# Patient Record
Sex: FEMALE | Race: WHITE | NOT HISPANIC OR LATINO | Employment: FULL TIME | URBAN - METROPOLITAN AREA
[De-identification: names, ages, dates, MRNs, and addresses within clinical notes are randomized per-mention and may not be internally consistent; named-entity substitution may affect disease eponyms.]

---

## 2019-01-27 ENCOUNTER — HOSPITAL ENCOUNTER (INPATIENT)
Facility: HOSPITAL | Age: 69
LOS: 3 days | Discharge: HOME | DRG: 202 | End: 2019-01-30
Attending: EMERGENCY MEDICINE | Admitting: EMERGENCY MEDICINE
Payer: MEDICARE

## 2019-01-27 DIAGNOSIS — J45.901 ASTHMA WITH ACUTE EXACERBATION, UNSPECIFIED ASTHMA SEVERITY, UNSPECIFIED WHETHER PERSISTENT: Primary | ICD-10-CM

## 2019-01-27 DIAGNOSIS — J45.901 EXACERBATION OF ASTHMA, UNSPECIFIED ASTHMA SEVERITY, UNSPECIFIED WHETHER PERSISTENT: ICD-10-CM

## 2019-01-27 DIAGNOSIS — R79.89 ELEVATED BRAIN NATRIURETIC PEPTIDE (BNP) LEVEL: ICD-10-CM

## 2019-01-27 DIAGNOSIS — R09.02 HYPOXEMIA: ICD-10-CM

## 2019-01-27 DIAGNOSIS — J98.01 BRONCHOSPASM: ICD-10-CM

## 2019-01-27 DIAGNOSIS — R29.818 SUSPECTED SLEEP APNEA: ICD-10-CM

## 2019-01-27 DIAGNOSIS — I10 ESSENTIAL HYPERTENSION: Chronic | ICD-10-CM

## 2019-01-27 LAB
ALBUMIN SERPL BCP-MCNC: 3.5 G/DL
ALLENS TEST: ABNORMAL
ALP SERPL-CCNC: 76 U/L
ALT SERPL W/O P-5'-P-CCNC: 15 U/L
ANION GAP SERPL CALC-SCNC: 9 MMOL/L
APTT BLDCRRT: 26.6 SEC
AST SERPL-CCNC: 20 U/L
BACTERIA #/AREA URNS AUTO: NORMAL /HPF
BASOPHILS # BLD AUTO: 0.05 K/UL
BASOPHILS NFR BLD: 0.8 %
BILIRUB SERPL-MCNC: 0.6 MG/DL
BILIRUB UR QL STRIP: NEGATIVE
BNP SERPL-MCNC: 230 PG/ML
BUN SERPL-MCNC: 21 MG/DL
CALCIUM SERPL-MCNC: 9.6 MG/DL
CHLORIDE SERPL-SCNC: 101 MMOL/L
CK MB SERPL-MCNC: 0.8 NG/ML
CK MB SERPL-RTO: 1.5 %
CK SERPL-CCNC: 52 U/L
CK SERPL-CCNC: 52 U/L
CLARITY UR REFRACT.AUTO: CLEAR
CO2 SERPL-SCNC: 30 MMOL/L
COLOR UR AUTO: YELLOW
CREAT SERPL-MCNC: 0.8 MG/DL
DELSYS: ABNORMAL
DIFFERENTIAL METHOD: ABNORMAL
EOSINOPHIL # BLD AUTO: 0.1 K/UL
EOSINOPHIL NFR BLD: 0.9 %
ERYTHROCYTE [DISTWIDTH] IN BLOOD BY AUTOMATED COUNT: 13.1 %
EST. GFR  (AFRICAN AMERICAN): >60 ML/MIN/1.73 M^2
EST. GFR  (NON AFRICAN AMERICAN): >60 ML/MIN/1.73 M^2
FIO2: 21
GLUCOSE SERPL-MCNC: 115 MG/DL
GLUCOSE UR QL STRIP: NEGATIVE
HCO3 UR-SCNC: 28.3 MMOL/L (ref 24–28)
HCT VFR BLD AUTO: 51 %
HGB BLD-MCNC: 16.6 G/DL
HGB UR QL STRIP: ABNORMAL
HYALINE CASTS UR QL AUTO: 1 /LPF
INFLUENZA A, MOLECULAR: NEGATIVE
INFLUENZA B, MOLECULAR: NEGATIVE
INR PPP: 1.1
KETONES UR QL STRIP: NEGATIVE
LACTATE SERPL-SCNC: 1.6 MMOL/L
LEUKOCYTE ESTERASE UR QL STRIP: NEGATIVE
LYMPHOCYTES # BLD AUTO: 1.2 K/UL
LYMPHOCYTES NFR BLD: 19 %
MCH RBC QN AUTO: 32.8 PG
MCHC RBC AUTO-ENTMCNC: 32.5 G/DL
MCV RBC AUTO: 101 FL
MICROSCOPIC COMMENT: NORMAL
MODE: ABNORMAL
MONOCYTES # BLD AUTO: 0.8 K/UL
MONOCYTES NFR BLD: 12.5 %
NEUTROPHILS # BLD AUTO: 4.3 K/UL
NEUTROPHILS NFR BLD: 66.6 %
NITRITE UR QL STRIP: NEGATIVE
PCO2 BLDA: 51.3 MMHG (ref 35–45)
PH SMN: 7.35 [PH] (ref 7.35–7.45)
PH UR STRIP: 6 [PH] (ref 5–8)
PLATELET # BLD AUTO: 143 K/UL
PMV BLD AUTO: 10.5 FL
PO2 BLDA: 43 MMHG (ref 80–100)
POC BE: 3 MMOL/L
POC SATURATED O2: 75 % (ref 95–100)
POTASSIUM SERPL-SCNC: 3.7 MMOL/L
PROT SERPL-MCNC: 7.6 G/DL
PROT UR QL STRIP: ABNORMAL
PROTHROMBIN TIME: 11 SEC
RBC # BLD AUTO: 5.06 M/UL
RBC #/AREA URNS AUTO: 3 /HPF (ref 0–4)
SAMPLE: ABNORMAL
SITE: ABNORMAL
SODIUM SERPL-SCNC: 140 MMOL/L
SP GR UR STRIP: 1.02 (ref 1–1.03)
SPECIMEN SOURCE: NORMAL
TROPONIN I SERPL DL<=0.01 NG/ML-MCNC: <0.006 NG/ML
URN SPEC COLLECT METH UR: ABNORMAL
UROBILINOGEN UR STRIP-ACNC: NEGATIVE EU/DL
WBC # BLD AUTO: 6.48 K/UL
WBC #/AREA URNS AUTO: 1 /HPF (ref 0–5)

## 2019-01-27 PROCEDURE — 99285 EMERGENCY DEPT VISIT HI MDM: CPT | Mod: 25,ER

## 2019-01-27 PROCEDURE — 96366 THER/PROPH/DIAG IV INF ADDON: CPT | Mod: ER

## 2019-01-27 PROCEDURE — 85610 PROTHROMBIN TIME: CPT | Mod: ER

## 2019-01-27 PROCEDURE — 87502 INFLUENZA DNA AMP PROBE: CPT | Mod: ER

## 2019-01-27 PROCEDURE — 36600 WITHDRAWAL OF ARTERIAL BLOOD: CPT | Mod: ER

## 2019-01-27 PROCEDURE — 94761 N-INVAS EAR/PLS OXIMETRY MLT: CPT

## 2019-01-27 PROCEDURE — 25000242 PHARM REV CODE 250 ALT 637 W/ HCPCS: Mod: ER | Performed by: EMERGENCY MEDICINE

## 2019-01-27 PROCEDURE — 94640 AIRWAY INHALATION TREATMENT: CPT | Mod: ER

## 2019-01-27 PROCEDURE — 93010 ELECTROCARDIOGRAM REPORT: CPT | Mod: ,,, | Performed by: NUCLEAR MEDICINE

## 2019-01-27 PROCEDURE — 84484 ASSAY OF TROPONIN QUANT: CPT | Mod: ER

## 2019-01-27 PROCEDURE — 96375 TX/PRO/DX INJ NEW DRUG ADDON: CPT | Mod: ER

## 2019-01-27 PROCEDURE — 63600175 PHARM REV CODE 636 W HCPCS: Mod: ER | Performed by: EMERGENCY MEDICINE

## 2019-01-27 PROCEDURE — 25500020 PHARM REV CODE 255: Mod: ER | Performed by: EMERGENCY MEDICINE

## 2019-01-27 PROCEDURE — 83880 ASSAY OF NATRIURETIC PEPTIDE: CPT | Mod: ER

## 2019-01-27 PROCEDURE — 25000242 PHARM REV CODE 250 ALT 637 W/ HCPCS: Performed by: NURSE PRACTITIONER

## 2019-01-27 PROCEDURE — 85025 COMPLETE CBC W/AUTO DIFF WBC: CPT | Mod: ER

## 2019-01-27 PROCEDURE — 82553 CREATINE MB FRACTION: CPT | Mod: ER

## 2019-01-27 PROCEDURE — 94640 AIRWAY INHALATION TREATMENT: CPT

## 2019-01-27 PROCEDURE — 93005 ELECTROCARDIOGRAM TRACING: CPT | Mod: ER

## 2019-01-27 PROCEDURE — 99900035 HC TECH TIME PER 15 MIN (STAT): Mod: ER

## 2019-01-27 PROCEDURE — 81000 URINALYSIS NONAUTO W/SCOPE: CPT | Mod: ER

## 2019-01-27 PROCEDURE — 21400001 HC TELEMETRY ROOM

## 2019-01-27 PROCEDURE — 87040 BLOOD CULTURE FOR BACTERIA: CPT

## 2019-01-27 PROCEDURE — 96365 THER/PROPH/DIAG IV INF INIT: CPT | Mod: ER

## 2019-01-27 PROCEDURE — 82550 ASSAY OF CK (CPK): CPT | Mod: ER

## 2019-01-27 PROCEDURE — 85730 THROMBOPLASTIN TIME PARTIAL: CPT | Mod: ER

## 2019-01-27 PROCEDURE — 80053 COMPREHEN METABOLIC PANEL: CPT | Mod: ER

## 2019-01-27 PROCEDURE — 93010 EKG 12-LEAD: ICD-10-PCS | Mod: ,,, | Performed by: NUCLEAR MEDICINE

## 2019-01-27 PROCEDURE — 96367 TX/PROPH/DG ADDL SEQ IV INF: CPT | Mod: ER

## 2019-01-27 PROCEDURE — 83605 ASSAY OF LACTIC ACID: CPT | Mod: ER

## 2019-01-27 RX ORDER — LEVOFLOXACIN 5 MG/ML
750 INJECTION, SOLUTION INTRAVENOUS
Status: COMPLETED | OUTPATIENT
Start: 2019-01-27 | End: 2019-01-27

## 2019-01-27 RX ORDER — SODIUM CHLORIDE 0.9 % (FLUSH) 0.9 %
5 SYRINGE (ML) INJECTION
Status: DISCONTINUED | OUTPATIENT
Start: 2019-01-27 | End: 2019-01-30 | Stop reason: HOSPADM

## 2019-01-27 RX ORDER — ASCORBIC ACID 1000 MG
TABLET ORAL
COMMUNITY

## 2019-01-27 RX ORDER — MULTIVITAMIN
1 TABLET ORAL
COMMUNITY

## 2019-01-27 RX ORDER — IPRATROPIUM BROMIDE AND ALBUTEROL SULFATE 2.5; .5 MG/3ML; MG/3ML
3 SOLUTION RESPIRATORY (INHALATION)
Status: COMPLETED | OUTPATIENT
Start: 2019-01-27 | End: 2019-01-27

## 2019-01-27 RX ORDER — FLUTICASONE PROPIONATE AND SALMETEROL 100; 50 UG/1; UG/1
1 POWDER RESPIRATORY (INHALATION)
COMMUNITY
Start: 2018-07-17 | End: 2019-03-22

## 2019-01-27 RX ORDER — ATENOLOL AND CHLORTHALIDONE TABLET 50; 25 MG/1; MG/1
1 TABLET ORAL
COMMUNITY
Start: 2018-08-31

## 2019-01-27 RX ORDER — PNV NO.95/FERROUS FUM/FOLIC AC 28MG-0.8MG
1000 TABLET ORAL
COMMUNITY

## 2019-01-27 RX ORDER — ENOXAPARIN SODIUM 100 MG/ML
40 INJECTION SUBCUTANEOUS EVERY 24 HOURS
Status: DISCONTINUED | OUTPATIENT
Start: 2019-01-27 | End: 2019-01-30 | Stop reason: HOSPADM

## 2019-01-27 RX ORDER — IBUPROFEN 100 MG/5ML
1000 SUSPENSION, ORAL (FINAL DOSE FORM) ORAL
COMMUNITY

## 2019-01-27 RX ORDER — IPRATROPIUM BROMIDE AND ALBUTEROL SULFATE 2.5; .5 MG/3ML; MG/3ML
3 SOLUTION RESPIRATORY (INHALATION)
Status: DISCONTINUED | OUTPATIENT
Start: 2019-01-28 | End: 2019-01-30 | Stop reason: HOSPADM

## 2019-01-27 RX ORDER — CYANOCOBALAMIN (VITAMIN B-12) 250 MCG
250 TABLET ORAL
COMMUNITY

## 2019-01-27 RX ORDER — METHYLPREDNISOLONE SOD SUCC 125 MG
125 VIAL (EA) INJECTION
Status: COMPLETED | OUTPATIENT
Start: 2019-01-27 | End: 2019-01-27

## 2019-01-27 RX ORDER — IPRATROPIUM BROMIDE AND ALBUTEROL SULFATE 2.5; .5 MG/3ML; MG/3ML
3 SOLUTION RESPIRATORY (INHALATION) EVERY 4 HOURS PRN
Status: DISCONTINUED | OUTPATIENT
Start: 2019-01-27 | End: 2019-01-30 | Stop reason: HOSPADM

## 2019-01-27 RX ADMIN — LEVOFLOXACIN 750 MG: 750 INJECTION, SOLUTION INTRAVENOUS at 11:01

## 2019-01-27 RX ADMIN — IPRATROPIUM BROMIDE AND ALBUTEROL SULFATE 3 ML: .5; 3 SOLUTION RESPIRATORY (INHALATION) at 02:01

## 2019-01-27 RX ADMIN — IPRATROPIUM BROMIDE AND ALBUTEROL SULFATE 3 ML: .5; 3 SOLUTION RESPIRATORY (INHALATION) at 11:01

## 2019-01-27 RX ADMIN — CEFTRIAXONE 1 G: 1 INJECTION, SOLUTION INTRAVENOUS at 11:01

## 2019-01-27 RX ADMIN — METHYLPREDNISOLONE SODIUM SUCCINATE 125 MG: 125 INJECTION, POWDER, FOR SOLUTION INTRAMUSCULAR; INTRAVENOUS at 11:01

## 2019-01-27 RX ADMIN — IOHEXOL 75 ML: 350 INJECTION, SOLUTION INTRAVENOUS at 02:01

## 2019-01-27 NOTE — ED NOTES
"Informed Dr. Matthews that pt states "I change my mind and now wants to go to [facility] facility."   "

## 2019-01-27 NOTE — ED PROVIDER NOTES
Encounter Date: 1/27/2019       History     Chief Complaint   Patient presents with    Nasal Congestion     associated with SOB and a dry cough. Fever 100.6 last night, took advil.      Lifelong history of asthma, reports she is only maintained on Advair and does not have other inhalers.  Not on oxygen or nebulizers at home. No other MDI's.  Apparently no hospitalization just for asthma, although she has been hospitalized for pneumonia in the past.  No other history of lung disease, venous thromboembolic disease, or heart disease.  No recent antibiotics or steroids.  Primary care doctor lorena nye, is not followed by pulmonology or other specialists besides an eye doctor.  Mild malaise onset yesterday, worse today with herberth dyspnea, minimal sputum production, dry cough, and fever as high as 100.6.  Headache, generalized ache and dyspnea progressing during the day today.  Has not smoked in over 14 years.  No other complaints.  Here with family.      The history is provided by the patient and the spouse. No  was used.     Review of patient's allergies indicates:  No Known Allergies  History reviewed. No pertinent past medical history.  No past surgical history on file.  History reviewed. No pertinent family history.  Social History     Tobacco Use    Smoking status: Not on file   Substance Use Topics    Alcohol use: Not on file    Drug use: Not on file     Review of Systems   Constitutional: Negative for activity change, fatigue and fever.   HENT: Negative for congestion, ear pain, facial swelling, nosebleeds, sinus pressure and sore throat.    Eyes: Negative for pain, discharge, redness and visual disturbance.   Respiratory: Positive for cough, shortness of breath and wheezing. Negative for choking and chest tightness.    Cardiovascular: Negative for chest pain, palpitations and leg swelling.   Gastrointestinal: Negative for abdominal distention, abdominal pain, nausea and vomiting.   Endocrine:  Negative for heat intolerance, polydipsia and polyuria.   Genitourinary: Negative for difficulty urinating, dysuria, flank pain, hematuria and urgency.   Musculoskeletal: Negative for back pain, gait problem, joint swelling and myalgias.   Skin: Negative for color change and rash.   Allergic/Immunologic: Negative for environmental allergies and food allergies.   Neurological: Negative for dizziness, weakness, numbness and headaches.   Hematological: Negative for adenopathy. Does not bruise/bleed easily.   Psychiatric/Behavioral: Negative for agitation and behavioral problems. The patient is not nervous/anxious.    All other systems reviewed and are negative.      Physical Exam     Initial Vitals [01/27/19 1030]   BP Pulse Resp Temp SpO2   (!) 182/81 79 20 98.4 °F (36.9 °C) (!) 77 %      MAP       --         Physical Exam    Nursing note and vitals reviewed.  Constitutional: She appears well-developed and well-nourished. She is not diaphoretic. No distress.   Obese   HENT:   Head: Normocephalic and atraumatic.   Mouth/Throat: No oropharyngeal exudate.   Eyes: Conjunctivae and EOM are normal. Pupils are equal, round, and reactive to light. Right eye exhibits no discharge. Left eye exhibits no discharge. No scleral icterus.   Neck: Normal range of motion. Neck supple. No thyromegaly present. No tracheal deviation present. No JVD present.   Cardiovascular: Normal rate, regular rhythm, normal heart sounds and intact distal pulses. Exam reveals no gallop and no friction rub.    No murmur heard.  Pulmonary/Chest: No stridor. She is in respiratory distress. She has wheezes. She has no rhonchi. She has no rales. She exhibits no tenderness.   Decreased air entry throughout/ moderate diffuse wheezing   Abdominal: Soft. Bowel sounds are normal. She exhibits no distension and no mass. There is no tenderness. There is no rebound and no guarding.   Musculoskeletal: Normal range of motion. She exhibits no edema or tenderness.    Neurological: She is alert and oriented to person, place, and time. She has normal strength.   Skin: Skin is warm and dry. No rash and no abscess noted. No erythema.   Mild cyanosis   Psychiatric: She has a normal mood and affect. Her behavior is normal. Judgment and thought content normal.         ED Course   Procedures  Labs Reviewed   CBC W/ AUTO DIFFERENTIAL - Abnormal; Notable for the following components:       Result Value    Hemoglobin 16.6 (*)     Hematocrit 51.0 (*)      (*)     MCH 32.8 (*)     Platelets 143 (*)     All other components within normal limits   COMPREHENSIVE METABOLIC PANEL - Abnormal; Notable for the following components:    CO2 30 (*)     Glucose 115 (*)     All other components within normal limits   URINALYSIS, REFLEX TO URINE CULTURE - Abnormal; Notable for the following components:    Protein, UA 2+ (*)     Occult Blood UA Trace (*)     All other components within normal limits    Narrative:     Preferred Collection Type->Urine, Clean Catch   B-TYPE NATRIURETIC PEPTIDE - Abnormal; Notable for the following components:     (*)     All other components within normal limits   ISTAT PROCEDURE - Abnormal; Notable for the following components:    POC PCO2 51.3 (*)     POC PO2 43 (*)     POC HCO3 28.3 (*)     POC SATURATED O2 75 (*)     All other components within normal limits   INFLUENZA A & B BY MOLECULAR   CULTURE, BLOOD   CULTURE, BLOOD   LACTIC ACID, PLASMA   TROPONIN I   PROTIME-INR   APTT   CK   CK-MB   URINALYSIS MICROSCOPIC    Narrative:     Preferred Collection Type->Urine, Clean Catch     EKG Readings: (Independently Interpreted)   Initial Reading: No STEMI. Rhythm: Normal Sinus Rhythm. Heart Rate: 67. Ectopy: No Ectopy. Conduction: Normal. Axis: Normal.   Noisy baseline/ nonspecific ST& T changes       Imaging Results          CTA Chest Non-Coronary (PE Study) (Final result)  Result time 01/27/19 14:20:58    Final result by Stefan Phillips Jr., MD (01/27/19  14:20:58)                 Impression:      No pulmonary emboli are seen.  No acute pulmonary parenchymal findings.  Scattered small lymph nodes, as above.    All CT scans at this facility are performed  using dose modulation techniques as appropriate to performed exam including the following:  automated exposure control; adjustment of mA and/or kV according to the patients size (this includes techniques or standardized protocols for targeted exams where dose is matched to indication/reason for exam: i.e. extremities or head);  iterative reconstruction technique.      Electronically signed by: Chris Reaves MD  Date:    01/27/2019  Time:    14:20             Narrative:    EXAMINATION:  CTA CHEST NON CORONARY    CLINICAL HISTORY:  Dyspnea, cardiac origin suspected;    TECHNIQUE:  Postcontrast axial images of the chest were obtained.   Omnipaque 350 was administered.  Multiplanar thick slab MIP re-formatted images were produced and reviewed.    COMPARISON:  No comparison studies available.    FINDINGS:  Angiogram: Good opacification of the pulmonary arterial system.  No central pulmonary emboli.  No peripheral pulmonary emboli.    Pulmonary: No infiltrates or effusion.  No suspicious pulmonary mass.  Aortic and coronary artery calcifications.  No pericardial fluid.  Scattered mediastinal lymph nodes are present, largest near the AP window region, measuring 2.0 cm.  Small bilateral hilar lymph nodes, largest on the right, measuring 1.5 cm.  No acute upper abdominal findings.  Small retrocrural lymph nodes.                               X-Ray Chest PA And Lateral (Final result)  Result time 01/27/19 12:29:31    Final result by Dm Andre MD (01/27/19 12:29:31)                 Impression:      1.  There is similar degree and distribution of interstitial changes and vascular congestion throughout the lungs.  This could be related to chronic underlying interstitial lung disease versus recurrent interstitial changes  throughout the lungs, which could include vascular congestion/pulmonary edema or interstitial infectious process.  Clinical correlation is advised.    2.  Other stable findings as noted above.      Electronically signed by: Dm Andre MD  Date:    01/27/2019  Time:    12:29             Narrative:    EXAMINATION:  XR CHEST PA AND LATERAL    CLINICAL HISTORY:  Cough;    COMPARISON:  March 22, 2016    FINDINGS:  EKG leads overlie the chest which is rotated to the right.  There is a similar degree in distribution of interstitial changes and vascular congestion throughout the lungs.  The lungs are free of new pulmonary opacities.  The cardiac silhouette size is enlarged.  The trachea is midline and the mediastinal width is normal. Negative for effusion or pneumothorax.  Negative for osseous abnormalities. Ectatic and tortuous aorta.  Calcifications in the aortic knob.  Marginal spondylosis.  Degenerative changes of the shoulder girdles.                                  11:21 AM O2 sat 93-94% on 5 liters NC O2.    12:42 PM Stable, maintaining oxygen saturation of 92% on 5 L nasal cannula.  Improved air entry and bronchospasm on exam.  Reviewed results with patient and .  Unable to obtain D-dimer at this facility at present, will proceed with CTA chest to rule out pulmonary embolism.  Anticipate Hospital Medicine admission  for hypoxemia and persistent asthma exacerbation.      2:44 PM Stable. Oxygen saturation 91 or 92% on 4-5 L nasal cannula.  Lung exam improved.  Mental status remains normal.  Counseled patient and  in detail, CT a does not reveal evidence for pulmonary embolism or infiltrate.  She will need more aggressive asthma treatment after her immediate inpatient stay.  I have counseled the patient and her  regarding the need for transfer to a higher level of care for inpatient admission, and they have verbalized understanding.  The reason for transfer is services not available at this  freestanding ER, particularly admission for Hospital Medicine consultation and possible pulmonary medicine consultation.  She will be transported by [facility] with continued supplemental oxygen and routine cardiopulmonary monitoring in route, inhaled bronchodilators in route if needed.  Risk of transfer includes motor vehicle accident, worsening of condition, and death.  Benefit of transfer includes available services as described above.  The patient's facility of choice is the [facility], we have requested their services.  Stable for ground transport.    3:09 PM She has changed her mind and is now requesting [facility] [place].  All other considerations above are the same.                              Clinical Impression:     1. Hypoxemia    2. Bronchospasm    3. Exacerbation of asthma, unspecified asthma severity, unspecified whether persistent            Disposition:   Disposition: Admitted  Condition: [facility] [place] [facility] Telemetry Inpatient Dr. Kemal Matthews MD  01/27/19 1447       Antony Matthews MD  01/27/19 1510       Antony Matthews MD  01/27/19 1516

## 2019-01-27 NOTE — ED NOTES
[facility] aware of pt's change of preference for transport to [facility].

## 2019-01-27 NOTE — ED NOTES
Annabelle, RT at bedside. Unable to obtain SPO2 above 77% in triage. Hands are cold. Dr. Matthews aware.

## 2019-01-28 PROBLEM — J45.901 ASTHMA WITH ACUTE EXACERBATION: Status: ACTIVE | Noted: 2019-01-28

## 2019-01-28 PROBLEM — J45.901 ASTHMA WITH ACUTE EXACERBATION: Status: RESOLVED | Noted: 2019-01-28 | Resolved: 2019-01-28

## 2019-01-28 PROBLEM — J96.01 ACUTE RESPIRATORY FAILURE WITH HYPOXIA AND HYPERCAPNIA: Status: ACTIVE | Noted: 2019-01-27

## 2019-01-28 PROBLEM — J96.02 ACUTE RESPIRATORY FAILURE WITH HYPOXIA AND HYPERCAPNIA: Status: ACTIVE | Noted: 2019-01-27

## 2019-01-28 PROBLEM — J42 CHRONIC BRONCHITIS WITH ACUTE EXACERBATION: Status: ACTIVE | Noted: 2019-01-28

## 2019-01-28 PROBLEM — J20.9 CHRONIC BRONCHITIS WITH ACUTE EXACERBATION: Status: ACTIVE | Noted: 2019-01-28

## 2019-01-28 LAB
ALLENS TEST: ABNORMAL
DELSYS: ABNORMAL
FLOW: 5
HCO3 UR-SCNC: 31.7 MMOL/L (ref 24–28)
MODE: ABNORMAL
PCO2 BLDA: 60.3 MMHG (ref 35–45)
PH SMN: 7.33 [PH] (ref 7.35–7.45)
PO2 BLDA: 60 MMHG (ref 80–100)
POC BE: 6 MMOL/L
POC SATURATED O2: 88 % (ref 95–100)
SAMPLE: ABNORMAL
SITE: ABNORMAL

## 2019-01-28 PROCEDURE — 94640 AIRWAY INHALATION TREATMENT: CPT

## 2019-01-28 PROCEDURE — 63600175 PHARM REV CODE 636 W HCPCS: Performed by: EMERGENCY MEDICINE

## 2019-01-28 PROCEDURE — 99900035 HC TECH TIME PER 15 MIN (STAT)

## 2019-01-28 PROCEDURE — 25000242 PHARM REV CODE 250 ALT 637 W/ HCPCS: Performed by: NURSE PRACTITIONER

## 2019-01-28 PROCEDURE — 82803 BLOOD GASES ANY COMBINATION: CPT

## 2019-01-28 PROCEDURE — 63600175 PHARM REV CODE 636 W HCPCS: Performed by: NURSE PRACTITIONER

## 2019-01-28 PROCEDURE — 94799 UNLISTED PULMONARY SVC/PX: CPT

## 2019-01-28 PROCEDURE — 21400001 HC TELEMETRY ROOM

## 2019-01-28 PROCEDURE — 25000003 PHARM REV CODE 250: Performed by: NURSE PRACTITIONER

## 2019-01-28 PROCEDURE — 25000242 PHARM REV CODE 250 ALT 637 W/ HCPCS: Performed by: INTERNAL MEDICINE

## 2019-01-28 PROCEDURE — 94761 N-INVAS EAR/PLS OXIMETRY MLT: CPT

## 2019-01-28 PROCEDURE — 27000221 HC OXYGEN, UP TO 24 HOURS

## 2019-01-28 PROCEDURE — 36600 WITHDRAWAL OF ARTERIAL BLOOD: CPT

## 2019-01-28 RX ORDER — MONTELUKAST SODIUM 10 MG/1
10 TABLET ORAL DAILY
Status: DISCONTINUED | OUTPATIENT
Start: 2019-01-28 | End: 2019-01-30 | Stop reason: HOSPADM

## 2019-01-28 RX ORDER — ACETAMINOPHEN 325 MG/1
650 TABLET ORAL EVERY 6 HOURS PRN
Status: DISCONTINUED | OUTPATIENT
Start: 2019-01-28 | End: 2019-01-30 | Stop reason: HOSPADM

## 2019-01-28 RX ORDER — CYANOCOBALAMIN (VITAMIN B-12) 250 MCG
250 TABLET ORAL DAILY
Status: DISCONTINUED | OUTPATIENT
Start: 2019-01-28 | End: 2019-01-30 | Stop reason: HOSPADM

## 2019-01-28 RX ORDER — AZITHROMYCIN 250 MG/1
250 TABLET, FILM COATED ORAL DAILY
Status: DISCONTINUED | OUTPATIENT
Start: 2019-01-28 | End: 2019-01-30 | Stop reason: HOSPADM

## 2019-01-28 RX ORDER — FLUTICASONE FUROATE AND VILANTEROL 100; 25 UG/1; UG/1
1 POWDER RESPIRATORY (INHALATION) DAILY
Status: DISCONTINUED | OUTPATIENT
Start: 2019-01-28 | End: 2019-01-28 | Stop reason: CLARIF

## 2019-01-28 RX ORDER — BUDESONIDE 0.5 MG/2ML
0.5 INHALANT ORAL EVERY 12 HOURS
Status: DISCONTINUED | OUTPATIENT
Start: 2019-01-28 | End: 2019-01-30 | Stop reason: HOSPADM

## 2019-01-28 RX ORDER — ATENOLOL 50 MG/1
50 TABLET ORAL DAILY
Status: DISCONTINUED | OUTPATIENT
Start: 2019-01-28 | End: 2019-01-30 | Stop reason: HOSPADM

## 2019-01-28 RX ORDER — ARFORMOTEROL TARTRATE 15 UG/2ML
15 SOLUTION RESPIRATORY (INHALATION) 2 TIMES DAILY
Status: DISCONTINUED | OUTPATIENT
Start: 2019-01-28 | End: 2019-01-28

## 2019-01-28 RX ORDER — LEVOFLOXACIN 500 MG/1
500 TABLET, FILM COATED ORAL DAILY
Status: DISCONTINUED | OUTPATIENT
Start: 2019-01-28 | End: 2019-01-28

## 2019-01-28 RX ORDER — ASCORBIC ACID 500 MG
1000 TABLET ORAL DAILY
Status: DISCONTINUED | OUTPATIENT
Start: 2019-01-28 | End: 2019-01-30 | Stop reason: HOSPADM

## 2019-01-28 RX ORDER — SODIUM CHLORIDE 0.9 % (FLUSH) 0.9 %
3 SYRINGE (ML) INJECTION
Status: DISCONTINUED | OUTPATIENT
Start: 2019-01-28 | End: 2019-01-30 | Stop reason: HOSPADM

## 2019-01-28 RX ORDER — PROMETHAZINE HYDROCHLORIDE AND CODEINE PHOSPHATE 6.25; 1 MG/5ML; MG/5ML
5 SOLUTION ORAL EVERY 8 HOURS PRN
Status: DISCONTINUED | OUTPATIENT
Start: 2019-01-28 | End: 2019-01-30 | Stop reason: HOSPADM

## 2019-01-28 RX ORDER — RAMELTEON 8 MG/1
8 TABLET ORAL NIGHTLY PRN
Status: DISCONTINUED | OUTPATIENT
Start: 2019-01-28 | End: 2019-01-30 | Stop reason: HOSPADM

## 2019-01-28 RX ORDER — PANTOPRAZOLE SODIUM 40 MG/1
40 TABLET, DELAYED RELEASE ORAL DAILY
Status: DISCONTINUED | OUTPATIENT
Start: 2019-01-28 | End: 2019-01-30 | Stop reason: HOSPADM

## 2019-01-28 RX ADMIN — CYANOCOBALAMIN TAB 250 MCG 250 MCG: 250 TAB at 08:01

## 2019-01-28 RX ADMIN — IPRATROPIUM BROMIDE AND ALBUTEROL SULFATE 3 ML: .5; 3 SOLUTION RESPIRATORY (INHALATION) at 09:01

## 2019-01-28 RX ADMIN — METHYLPREDNISOLONE SODIUM SUCCINATE 40 MG: 40 INJECTION, POWDER, FOR SOLUTION INTRAMUSCULAR; INTRAVENOUS at 12:01

## 2019-01-28 RX ADMIN — METHYLPREDNISOLONE SODIUM SUCCINATE 80 MG: 40 INJECTION, POWDER, FOR SOLUTION INTRAMUSCULAR; INTRAVENOUS at 10:01

## 2019-01-28 RX ADMIN — IPRATROPIUM BROMIDE AND ALBUTEROL SULFATE 3 ML: .5; 3 SOLUTION RESPIRATORY (INHALATION) at 07:01

## 2019-01-28 RX ADMIN — ATENOLOL 50 MG: 50 TABLET ORAL at 08:01

## 2019-01-28 RX ADMIN — AZITHROMYCIN 250 MG: 250 TABLET, FILM COATED ORAL at 08:01

## 2019-01-28 RX ADMIN — IPRATROPIUM BROMIDE AND ALBUTEROL SULFATE 3 ML: .5; 3 SOLUTION RESPIRATORY (INHALATION) at 01:01

## 2019-01-28 RX ADMIN — PANTOPRAZOLE SODIUM 40 MG: 40 TABLET, DELAYED RELEASE ORAL at 08:01

## 2019-01-28 RX ADMIN — MONTELUKAST SODIUM 10 MG: 10 TABLET, FILM COATED ORAL at 08:01

## 2019-01-28 RX ADMIN — OXYCODONE HYDROCHLORIDE AND ACETAMINOPHEN 1000 MG: 500 TABLET ORAL at 08:01

## 2019-01-28 RX ADMIN — ENOXAPARIN SODIUM 40 MG: 100 INJECTION SUBCUTANEOUS at 06:01

## 2019-01-28 RX ADMIN — METHYLPREDNISOLONE SODIUM SUCCINATE 80 MG: 40 INJECTION, POWDER, FOR SOLUTION INTRAMUSCULAR; INTRAVENOUS at 02:01

## 2019-01-28 RX ADMIN — BUDESONIDE 0.5 MG: 0.5 SUSPENSION RESPIRATORY (INHALATION) at 07:01

## 2019-01-28 RX ADMIN — BUDESONIDE 0.5 MG: 0.5 SUSPENSION RESPIRATORY (INHALATION) at 09:01

## 2019-01-28 RX ADMIN — METHYLPREDNISOLONE SODIUM SUCCINATE 80 MG: 40 INJECTION, POWDER, FOR SOLUTION INTRAMUSCULAR; INTRAVENOUS at 05:01

## 2019-01-28 NOTE — ASSESSMENT & PLAN NOTE
- O2 sat stable on NC supplementation, wean as tolerated  - Continue bronchodilators  - Continue Singulair  - Solumedrol 80mg Q8  - Empiric PO azithromycin

## 2019-01-28 NOTE — PLAN OF CARE
Pt in bed AAOx4.   Plan of Care reviewed, Verbalized understanding.  Patient remained free from falls, fall precautions in place.   Pt is NSR on monitor. VSS.   PIV CDI.   Call bell and personal belongings within reach.   Hourly rounding complete. Reminded to call for assistance.   No complaints at this time.   Will continue to monitor.

## 2019-01-28 NOTE — SUBJECTIVE & OBJECTIVE
Past Medical History:   Diagnosis Date    Arthritis     Hypertension     Asthma/Chronic bronchitis        History reviewed. No pertinent surgical history.    Review of patient's allergies indicates:  No Known Allergies    No current facility-administered medications on file prior to encounter.      Current Outpatient Medications on File Prior to Encounter   Medication Sig    ascorbic acid, vitamin C, (VITAMIN C) 1000 MG tablet Take 1,000 mg by mouth.    atenolol-chlorthalidone (TENORETIC) 50-25 mg Tab Take 1 tablet by mouth.    cyanocobalamin (VITAMIN B-12) 250 MCG tablet Take 250 mcg by mouth.    fluticasone-salmeterol 100-50 mcg/dose (ADVAIR DISKUS) 100-50 mcg/dose diskus inhaler Inhale 1 puff into the lungs.    ginkgo biloba 40 mg Tab Take by mouth.    multivitamin (ONE DAILY MULTIVITAMIN) per tablet Take 1 tablet by mouth.    vit A/C/E ac/ZnOx/cupric oxide (EYE VITAMIN AND MINERALS ORAL) Take by mouth.    vitamin E 1000 UNIT capsule Take 1,000 Units by mouth.     Family History     Reviewed and not pertinent.         Tobacco Use    Smoking status: Former Smoker    Smokeless tobacco: Never Used   Substance and Sexual Activity    Alcohol use: No     Frequency: Never    Drug use: No    Sexual activity: Not on file     Review of Systems   Constitutional: Positive for fever. Negative for activity change, chills, diaphoresis, fatigue and unexpected weight change.        Malaise.   HENT: Positive for congestion and rhinorrhea. Negative for sinus pressure and sore throat.    Respiratory: Positive for cough and shortness of breath. Negative for chest tightness and wheezing.    Cardiovascular: Negative for chest pain, palpitations and leg swelling.   Gastrointestinal: Negative for abdominal distention, abdominal pain, blood in stool, constipation, diarrhea, nausea and vomiting.   Genitourinary: Negative for dysuria and hematuria.   Musculoskeletal: Negative for arthralgias, back pain and myalgias.   Skin:  Negative for pallor, rash and wound.   Neurological: Negative for dizziness, syncope, weakness, light-headedness, numbness and headaches.   Psychiatric/Behavioral: Negative for confusion. The patient is not nervous/anxious.    All other systems reviewed and are negative.    Objective:     Vital Signs (Most Recent):  Temp: 98.4 °F (36.9 °C) (01/27/19 1958)  Pulse: 76 (01/27/19 1958)  Resp: (!) 24 (01/27/19 1958)  BP: (!) 178/73 (01/27/19 1958)  SpO2: (!) 93 % (01/27/19 1958) Vital Signs (24h Range):  Temp:  [98.4 °F (36.9 °C)-98.7 °F (37.1 °C)] 98.4 °F (36.9 °C)  Pulse:  [66-79] 76  Resp:  [13-26] 24  SpO2:  [77 %-95 %] 93 %  BP: (154-182)/(70-81) 178/73     Weight: 121.2 kg (267 lb 3.2 oz)  There is no height or weight on file to calculate BMI.    Physical Exam   Constitutional: She is oriented to person, place, and time. She appears well-developed and well-nourished. No distress.   HENT:   Head: Normocephalic and atraumatic.   Eyes: Conjunctivae are normal.   PERRL; EOM intact.   Neck: Normal range of motion. Neck supple. No JVD present.   Cardiovascular: Normal rate, regular rhythm, S1 normal, S2 normal and intact distal pulses.  No extrasystoles are present. Exam reveals no gallop and no friction rub.   No murmur heard.  Pulses:       Radial pulses are 2+ on the right side, and 2+ on the left side.        Dorsalis pedis pulses are 2+ on the right side, and 2+ on the left side.        Posterior tibial pulses are 2+ on the right side, and 2+ on the left side.   Pulmonary/Chest: Effort normal. No accessory muscle usage. Tachypnea noted. No respiratory distress. She has wheezes (diffuse). She has no rhonchi. She has no rales.   Decreased air entry.   Abdominal: Soft. Bowel sounds are normal. She exhibits no distension. There is no tenderness. There is no rebound, no guarding and no CVA tenderness.   Musculoskeletal: Normal range of motion. She exhibits no edema, tenderness or deformity.   Neurological: She is alert  and oriented to person, place, and time. No cranial nerve deficit or sensory deficit.   Skin: Skin is warm, dry and intact. Capillary refill takes less than 2 seconds. No rash noted. She is not diaphoretic. No cyanosis or erythema.   Psychiatric: She has a normal mood and affect. Her speech is normal and behavior is normal. Cognition and memory are normal.   Nursing note and vitals reviewed.          Significant Labs:   Results for orders placed or performed during the hospital encounter of 01/27/19   Influenza A & B by Molecular   Result Value Ref Range    Influenza A, Molecular Negative Negative    Influenza B, Molecular Negative Negative    Flu A & B Source NP    CBC auto differential   Result Value Ref Range    WBC 6.48 3.90 - 12.70 K/uL    RBC 5.06 4.00 - 5.40 M/uL    Hemoglobin 16.6 (H) 12.0 - 16.0 g/dL    Hematocrit 51.0 (H) 37.0 - 48.5 %     (H) 82 - 98 fL    MCH 32.8 (H) 27.0 - 31.0 pg    MCHC 32.5 32.0 - 36.0 g/dL    RDW 13.1 11.5 - 14.5 %    Platelets 143 (L) 150 - 350 K/uL    MPV 10.5 9.2 - 12.9 fL    Gran # (ANC) 4.3 1.8 - 7.7 K/uL    Lymph # 1.2 1.0 - 4.8 K/uL    Mono # 0.8 0.3 - 1.0 K/uL    Eos # 0.1 0.0 - 0.5 K/uL    Baso # 0.05 0.00 - 0.20 K/uL    Gran% 66.6 38.0 - 73.0 %    Lymph% 19.0 18.0 - 48.0 %    Mono% 12.5 4.0 - 15.0 %    Eosinophil% 0.9 0.0 - 8.0 %    Basophil% 0.8 0.0 - 1.9 %    Differential Method Automated    Comprehensive metabolic panel   Result Value Ref Range    Sodium 140 136 - 145 mmol/L    Potassium 3.7 3.5 - 5.1 mmol/L    Chloride 101 95 - 110 mmol/L    CO2 30 (H) 23 - 29 mmol/L    Glucose 115 (H) 70 - 110 mg/dL    BUN, Bld 21 8 - 23 mg/dL    Creatinine 0.8 0.5 - 1.4 mg/dL    Calcium 9.6 8.7 - 10.5 mg/dL    Total Protein 7.6 6.0 - 8.4 g/dL    Albumin 3.5 3.5 - 5.2 g/dL    Total Bilirubin 0.6 0.1 - 1.0 mg/dL    Alkaline Phosphatase 76 55 - 135 U/L    AST 20 10 - 40 U/L    ALT 15 10 - 44 U/L    Anion Gap 9 8 - 16 mmol/L    eGFR if African American >60.0 >60 mL/min/1.73 m^2     eGFR if non African American >60.0 >60 mL/min/1.73 m^2   Lactic acid, plasma   Result Value Ref Range    Lactate (Lactic Acid) 1.6 0.5 - 2.2 mmol/L   Urinalysis, Reflex to Urine Culture Urine, Clean Catch   Result Value Ref Range    Specimen UA Urine, Clean Catch     Color, UA Yellow Yellow, Straw, [place]    Appearance, UA Clear Clear    pH, UA 6.0 5.0 - 8.0    Specific Gravity, UA 1.020 1.005 - 1.030    Protein, UA 2+ (A) Negative    Glucose, UA Negative Negative    Ketones, UA Negative Negative    Bilirubin (UA) Negative Negative    Occult Blood UA Trace (A) Negative    Nitrite, UA Negative Negative    Urobilinogen, UA Negative <2.0 EU/dL    Leukocytes, UA Negative Negative   Troponin I   Result Value Ref Range    Troponin I <0.006 0.000 - 0.026 ng/mL   B-Type natriuretic peptide (BNP)   Result Value Ref Range     (H) 0 - 99 pg/mL   Protime-INR   Result Value Ref Range    Prothrombin Time 11.0 9.0 - 12.5 sec    INR 1.1 0.8 - 1.2   APTT   Result Value Ref Range    aPTT 26.6 21.0 - 32.0 sec   CK   Result Value Ref Range    CPK 52 20 - 180 U/L   CK-MB   Result Value Ref Range    CPK 52 20 - 180 U/L    CPK MB 0.8 0.1 - 6.5 ng/mL    MB% 1.5 0.0 - 5.0 %   Urinalysis Microscopic   Result Value Ref Range    RBC, UA 3 0 - 4 /hpf    WBC, UA 1 0 - 5 /hpf    Bacteria, UA Occasional None-Occ /hpf    Hyaline Casts, UA 1 0-1/lpf /lpf    Microscopic Comment SEE COMMENT    ISTAT PROCEDURE   Result Value Ref Range    POC PH 7.350 7.35 - 7.45    POC PCO2 51.3 (H) 35 - 45 mmHg    POC PO2 43 (LL) 80 - 100 mmHg    POC HCO3 28.3 (H) 24 - 28 mmol/L    POC BE 3 -2 to 2 mmol/L    POC SATURATED O2 75 (L) 95 - 100 %    Sample ARTERIAL     Site RR     Allens Test Pass     DelSys Room Air     Mode SPONT     FiO2 21       All pertinent labs within the past 24 hours have been reviewed.    Significant Imaging:   Imaging Results          CTA Chest Non-Coronary (PE Study) (Final result)  Result time 01/27/19 14:20:58    Final result by  Stefan Reaves Jr., MD (01/27/19 14:20:58)                 Impression:      No pulmonary emboli are seen.  No acute pulmonary parenchymal findings.  Scattered small lymph nodes, as above.    All CT scans at this facility are performed  using dose modulation techniques as appropriate to performed exam including the following:  automated exposure control; adjustment of mA and/or kV according to the patients size (this includes techniques or standardized protocols for targeted exams where dose is matched to indication/reason for exam: i.e. extremities or head);  iterative reconstruction technique.      Electronically signed by: Stefan Reaves MD  Date:    01/27/2019  Time:    14:20             Narrative:    EXAMINATION:  CTA CHEST NON CORONARY    CLINICAL HISTORY:  Dyspnea, cardiac origin suspected;    TECHNIQUE:  Postcontrast axial images of the chest were obtained.   Omnipaque 350 was administered.  Multiplanar thick slab MIP re-formatted images were produced and reviewed.    COMPARISON:  No comparison studies available.    FINDINGS:  Angiogram: Good opacification of the pulmonary arterial system.  No central pulmonary emboli.  No peripheral pulmonary emboli.    Pulmonary: No infiltrates or effusion.  No suspicious pulmonary mass.  Aortic and coronary artery calcifications.  No pericardial fluid.  Scattered mediastinal lymph nodes are present, largest near the AP window region, measuring 2.0 cm.  Small bilateral hilar lymph nodes, largest on the right, measuring 1.5 cm.  No acute upper abdominal findings.  Small retrocrural lymph nodes.                               X-Ray Chest PA And Lateral (Final result)  Result time 01/27/19 12:29:31    Final result by Dm Andre MD (01/27/19 12:29:31)                 Impression:      1.  There is similar degree and distribution of interstitial changes and vascular congestion throughout the lungs.  This could be related to chronic underlying interstitial lung disease  versus recurrent interstitial changes throughout the lungs, which could include vascular congestion/pulmonary edema or interstitial infectious process.  Clinical correlation is advised.    2.  Other stable findings as noted above.      Electronically signed by: Dm Andre MD  Date:    01/27/2019  Time:    12:29             Narrative:    EXAMINATION:  XR CHEST PA AND LATERAL    CLINICAL HISTORY:  Cough;    COMPARISON:  March 22, 2016    FINDINGS:  EKG leads overlie the chest which is rotated to the right.  There is a similar degree in distribution of interstitial changes and vascular congestion throughout the lungs.  The lungs are free of new pulmonary opacities.  The cardiac silhouette size is enlarged.  The trachea is midline and the mediastinal width is normal. Negative for effusion or pneumothorax.  Negative for osseous abnormalities. Ectatic and tortuous aorta.  Calcifications in the aortic knob.  Marginal spondylosis.  Degenerative changes of the shoulder girdles.                               I have reviewed all pertinent imaging results/findings within the past 24 hours.     EKG: (personally reviewed)  Normal sinus rhythm, nonspecific ST-T abnormality, occasional PAC.  No previous to compare.

## 2019-01-28 NOTE — HOSPITAL COURSE
Luisa Colon is a 68 year old female admitted for Asthma exacerbation. Pt currently receiving IV steroids, inhaled medications and PO Azithromycin. BBS are improved, slight scattered wheezing noted posteriorly. Pt currently on 2LPM. Denies SOB. Reports SOB much improved. Will attempt to wean O2. If unsuccessful, will need to obtain home oxygen evaluation prior to discharge. Hx of tobacco abuse. Quit smoking in 2005. Smoked over 40 years. Will refer to pulmonologist upon discharge for PFTs for possible underlying COPD and TREVOR workup. As of 01/29 Leukocytosis noted on labs, WBC count 14.93K from 6.48K. Remains afebrile. Likely secondary to IV steroids. Pt appears more dyspneic today. BBS are clear but diminished, no wheezes, rales, or rhonchi heard. Pt currently on 5LPM. Pt likely has undiagnosed COPD. Will consult Pulmonology, input appreciated. As of 01/30 symptoms have improved with IV steroids, inhaled medications, and PO abx. Elevated BNP upon admit of 231. Pt received Lasix 20 mg PO BID for 4 doses. Pt reported good UOP, documented 750 cc output. WBC count 14.52K upon discharge, likely secondary to IV steroids received. Pt remains afebrile. BC NGTD. Continue home medications. Pt will be discharged home with prednisone taper and Azithromycin 500 mg PO x 3 days. Pt qualified for home oxygen -- room air sat 91%, room air sat while exercising 85%. Pt will follow up with PCP within 2-3 days after discharge for hospital follow up. Pt will also follow up with Dr. Moise (pulmonology) within 1 week after discharge for hospital follow up including PFTs and TREVOR workup. This patient was seen and examined on the date of discharge and determined suitable for discharge.

## 2019-01-28 NOTE — ASSESSMENT & PLAN NOTE
- Secondary to asthma excerbation.  Patient with probable undiagnosed COPD. Hx of tobacco abuse, quit in 2005. Smoked > 40 years  - Inhaled medications  - Supplemental oxygen, keep O2 sats > 90% -- will attempt to wean. If unable to wean from O2, obtain home oxygen evaluation  - Will need OP referral to Pulmonology -- PFTs for possible underlying COPD and TREVOR  - Repeat ABG in AM -- improved

## 2019-01-28 NOTE — ASSESSMENT & PLAN NOTE
- Secondary to above.  Patient with probable undiagnosed COPD.  - Continue plan as above.  - Will need OP referral to Pulmonology.  - Repeat ABG in AM.

## 2019-01-28 NOTE — PLAN OF CARE
Met with patient and , Cosmo Colon at bedside to complete discharge planning assessment. Confirmed demographics and updated contact list. Patient reports that she lives at home with her  and has family nearby for support. Patient reports that she is independent with ADL's and denies any post hospital needs or services at this time. Informed patient that CM will continue to follow as d/c needs or recommendations change. Patient currently on oxygen and reports she is hoping to not need oxygen upon d/c. CM provided Transitional Care Folder with information on Advance Directives, Discharge Planning Begins on Admission pamphlet, and [facility] Bedside Delivery pamphlet. CM updated patient white board with current d/c plan and CM contact information. Encouraged patient and  to contact CM if any questions or concerns arise.  Report given to CM Evan Barrera LMSW    Preferred Pharmacy: [facility] in [place]    No Pharmacies Listed  Jan Oconnor MD  Payor: [employer] MEDICARE / Plan: [employer] MEDICARE HMO / Product Type: Capitation /         01/28/19 1526   Discharge Assessment   Assessment Type Discharge Planning Assessment   Confirmed/corrected address and phone number on facesheet? Yes   Assessment information obtained from? Patient;Caregiver  ( at bedside)   Communicated expected length of stay with patient/caregiver yes   Prior to hospitilization cognitive status: Alert/Oriented   Prior to hospitalization functional status: Independent   Current cognitive status: Alert/Oriented   Current Functional Status: Independent   Lives With spouse   Able to Return to Prior Arrangements yes   Is patient able to care for self after discharge? Yes   Who are your caregiver(s) and their phone number(s)? Cosmo Colon,  [phone]   Readmission Within the Last 30 Days no previous admission in last 30 days   Patient currently being followed by outpatient case management? No    Patient currently receives any other outside agency services? No   Equipment Currently Used at Home none   Do you have any problems affording any of your prescribed medications? No   Does the patient have transportation home? Yes   Transportation Anticipated family or friend will provide   Does the patient receive services at the [facility]? No   Discharge Plan A Home with family   DME Needed Upon Discharge  other (see comments)  (possibly home oxygen)   Patient/Family in Agreement with Plan yes

## 2019-01-28 NOTE — PLAN OF CARE
01/28/19 1500   Medicare Message   Important Message from Medicare regarding Discharge Appeal Rights Given to patient/caregiver;Explained to patient/caregiver;Signed/date by patient/caregiver   Date IMM was signed 01/28/19   Time IMM was signed 1500

## 2019-01-28 NOTE — PLAN OF CARE
Problem: Fall Injury Risk  Goal: Absence of Fall and Fall-Related Injury  Outcome: Ongoing (interventions implemented as appropriate)  Patient demonstrates good safety awareness. Uses call light appropriately and is compliant with non slip socks.

## 2019-01-28 NOTE — HPI
Ms. Colon is a 69yo female with a PMHx of asthma/chronic bronchitis, HTN, and 30 pack-year h/o tobacco smoking (quit 14 years ago), who presented to the ED with c/o SOB that has progressively worsened over the past 1 day.  Associated nonproductive cough, congestion, rhinorrhea, fever (TMax 100.6), and malaise.  No aggravating or alleviating factors.  Denies any CP, palpitations, orthopnea, PND, edema, ABD pain, N/V/D, dysuria, back pain, HA, lightheadedness, dizziness, syncope, sore throat, weakness, or chills.  Initial work-up resulted normal WBC, influenza negative, CXR unremarkable, CTA of chest negative for acute process.  ABG showed pH 7.35, pCO2 51.3, pO2 43, HCO3 28.3, SaO2 75 on RA.  Patient received Duonebs x 3 and IV Solumedrol 125mg, and placed on 5L NC in ED.  Patient reports SOB improved, and O2 sat remained stable.  Hospital Medicine was called for admission.

## 2019-01-28 NOTE — ASSESSMENT & PLAN NOTE
- O2 sat stable on NC supplementation, wean as tolerated.  - Continue bronchodilators.  - Will add Singulair.  - Solumedrol 80mg Q8.  - Empiric PO azithromycin.

## 2019-01-28 NOTE — PROGRESS NOTES
Clinical Pharmacy Progress Note: Telemetry Pharmacist Rounding     Patient was counseled on the telemetry pharmacist's availability to discuss new medications, side effects, and reasons for changes in medication during admission.   Asked if patient had any medication questions at this time.   Instructed patient to use call light with any questions or concerns to discuss with pharmacy during the admission.  Offered bedside medication delivery to patient.   Patient expressed understanding and had no further questions.    Thank you for allowing us to participate in this patient's care.    Maty Villela, Marilin 01/28/2019 3:37 PM

## 2019-01-28 NOTE — SUBJECTIVE & OBJECTIVE
Review of Systems   Constitutional: Positive for activity change. Negative for chills, diaphoresis, fatigue, fever and unexpected weight change.   HENT: Positive for congestion, rhinorrhea, sinus pressure and sinus pain. Negative for sore throat and trouble swallowing.    Eyes: Negative for visual disturbance.   Respiratory: Positive for cough and shortness of breath. Negative for chest tightness and wheezing.    Cardiovascular: Negative for chest pain, palpitations and leg swelling.   Gastrointestinal: Negative for abdominal distention, abdominal pain, blood in stool, constipation, diarrhea, nausea and vomiting.   Genitourinary: Negative for dysuria and hematuria.   Musculoskeletal: Negative for arthralgias, back pain and myalgias.   Skin: Negative for pallor, rash and wound.   Neurological: Negative for dizziness, syncope, weakness, light-headedness, numbness and headaches.   Psychiatric/Behavioral: Negative for confusion. The patient is not nervous/anxious.    All other systems reviewed and are negative.    Objective:     Vital Signs (Most Recent):  Temp: 97.9 °F (36.6 °C) (01/28/19 1557)  Pulse: 74 (01/28/19 1557)  Resp: 18 (01/28/19 1557)  BP: 132/75 (01/28/19 1557)  SpO2: (!) 90 % (01/28/19 1557) Vital Signs (24h Range):  Temp:  [97.8 °F (36.6 °C)-98.7 °F (37.1 °C)] 97.9 °F (36.6 °C)  Pulse:  [68-88] 74  Resp:  [17-24] 18  SpO2:  [85 %-94 %] 90 %  BP: (124-183)/(65-84) 132/75     Weight: 120 kg (264 lb 8.8 oz)  Body mass index is 45.41 kg/m².    Intake/Output Summary (Last 24 hours) at 1/28/2019 1626  Last data filed at 1/28/2019 0323  Gross per 24 hour   Intake 240 ml   Output --   Net 240 ml      Physical Exam   Constitutional: She is oriented to person, place, and time. She appears well-developed and well-nourished. No distress.   Obese   HENT:   Head: Normocephalic and atraumatic.   Nose: Right sinus exhibits frontal sinus tenderness. Left sinus exhibits frontal sinus tenderness.   Eyes: Conjunctivae are  normal.   PERRL; EOM intact.   Neck: Normal range of motion. Neck supple. No JVD present.   Cardiovascular: Normal rate, regular rhythm, S1 normal, S2 normal and intact distal pulses.  No extrasystoles are present. Exam reveals no gallop and no friction rub.   No murmur heard.  Pulses:       Radial pulses are 2+ on the right side, and 2+ on the left side.        Dorsalis pedis pulses are 2+ on the right side, and 2+ on the left side.        Posterior tibial pulses are 2+ on the right side, and 2+ on the left side.   Pulmonary/Chest: Effort normal. No accessory muscle usage. Tachypnea noted. No respiratory distress. She has wheezes (mild scattered wheezing posteriorly). She has no rhonchi. She has no rales.   Abdominal: Soft. Bowel sounds are normal. She exhibits no distension. There is no tenderness. There is no rebound, no guarding and no CVA tenderness.   Genitourinary:   Genitourinary Comments: Deferred   Musculoskeletal: Normal range of motion. She exhibits no edema, tenderness or deformity.   Neurological: She is alert and oriented to person, place, and time. No cranial nerve deficit or sensory deficit.   Skin: Skin is warm, dry and intact. Capillary refill takes less than 2 seconds. No rash noted. She is not diaphoretic. No cyanosis or erythema.   Psychiatric: She has a normal mood and affect. Her speech is normal and behavior is normal. Cognition and memory are normal.   Nursing note and vitals reviewed.      Significant Labs: All pertinent labs within the past 24 hours have been reviewed.    Significant Imaging: I have reviewed all pertinent imaging results/findings within the past 24 hours.

## 2019-01-28 NOTE — H&P
[facility] Hospital Medicine  History & Physical    Patient Name: Luisa Colon  MRN: [id number]  Admission Date: 1/27/2019  Attending Physician: Gabe Vasquez MD   Primary Care Provider: Jan Oconnor MD         Patient information was obtained from patient, past medical records and ER records.     Subjective:     Principal Problem:Asthma with acute exacerbation    Chief Complaint:   Chief Complaint   Patient presents with    Nasal Congestion     associated with SOB and a dry cough. Fever 100.6 last night, took advil.         HPI: Ms. Colon is a 67yo female with a PMHx of asthma/chronic bronchitis, HTN, and 30 pack-year h/o tobacco smoking (quit 14 years ago), who presented to the ED with c/o SOB that has progressively worsened over the past 1 day.  Associated nonproductive cough, congestion, rhinorrhea, fever (TMax 100.6), and malaise.  No aggravating or alleviating factors.  Denies any CP, palpitations, orthopnea, PND, edema, ABD pain, N/V/D, dysuria, back pain, HA, lightheadedness, dizziness, syncope, sore throat, weakness, or chills.  Initial work-up resulted normal WBC, influenza negative, CXR unremarkable, CTA of chest negative for acute process.  ABG showed pH 7.35, pCO2 51.3, pO2 43, HCO3 28.3, SaO2 75 on RA.  Patient received Duonebs x 3 and IV Solumedrol 125mg, and placed on 5L NC in ED.  Patient reports SOB improved, and O2 sat remained stable.  Hospital Medicine was called for admission.        Past Medical History:   Diagnosis Date    Arthritis     Hypertension     Asthma/Chronic bronchitis        History reviewed. No pertinent surgical history.    Review of patient's allergies indicates:  No Known Allergies    No current facility-administered medications on file prior to encounter.      Current Outpatient Medications on File Prior to Encounter   Medication Sig    ascorbic acid, vitamin C, (VITAMIN C) 1000 MG tablet Take 1,000 mg by mouth.    atenolol-chlorthalidone  (TENORETIC) 50-25 mg Tab Take 1 tablet by mouth.    cyanocobalamin (VITAMIN B-12) 250 MCG tablet Take 250 mcg by mouth.    fluticasone-salmeterol 100-50 mcg/dose (ADVAIR DISKUS) 100-50 mcg/dose diskus inhaler Inhale 1 puff into the lungs.    ginkgo biloba 40 mg Tab Take by mouth.    multivitamin (ONE DAILY MULTIVITAMIN) per tablet Take 1 tablet by mouth.    vit A/C/E ac/ZnOx/cupric oxide (EYE VITAMIN AND MINERALS ORAL) Take by mouth.    vitamin E 1000 UNIT capsule Take 1,000 Units by mouth.     Family History     Reviewed and not pertinent.         Tobacco Use    Smoking status: Former Smoker    Smokeless tobacco: Never Used   Substance and Sexual Activity    Alcohol use: No     Frequency: Never    Drug use: No    Sexual activity: Not on file     Review of Systems   Constitutional: Positive for fever. Negative for activity change, chills, diaphoresis, fatigue and unexpected weight change.        Malaise.   HENT: Positive for congestion and rhinorrhea. Negative for sinus pressure and sore throat.    Respiratory: Positive for cough and shortness of breath. Negative for chest tightness and wheezing.    Cardiovascular: Negative for chest pain, palpitations and leg swelling.   Gastrointestinal: Negative for abdominal distention, abdominal pain, blood in stool, constipation, diarrhea, nausea and vomiting.   Genitourinary: Negative for dysuria and hematuria.   Musculoskeletal: Negative for arthralgias, back pain and myalgias.   Skin: Negative for pallor, rash and wound.   Neurological: Negative for dizziness, syncope, weakness, light-headedness, numbness and headaches.   Psychiatric/Behavioral: Negative for confusion. The patient is not nervous/anxious.    All other systems reviewed and are negative.    Objective:     Vital Signs (Most Recent):  Temp: 98.4 °F (36.9 °C) (01/27/19 1958)  Pulse: 76 (01/27/19 1958)  Resp: (!) 24 (01/27/19 1958)  BP: (!) 178/73 (01/27/19 1958)  SpO2: (!) 93 % (01/27/19 1958) Vital  Signs (24h Range):  Temp:  [98.4 °F (36.9 °C)-98.7 °F (37.1 °C)] 98.4 °F (36.9 °C)  Pulse:  [66-79] 76  Resp:  [13-26] 24  SpO2:  [77 %-95 %] 93 %  BP: (154-182)/(70-81) 178/73     Weight: 121.2 kg (267 lb 3.2 oz)  There is no height or weight on file to calculate BMI.    Physical Exam   Constitutional: She is oriented to person, place, and time. She appears well-developed and well-nourished. No distress.   HENT:   Head: Normocephalic and atraumatic.   Eyes: Conjunctivae are normal.   PERRL; EOM intact.   Neck: Normal range of motion. Neck supple. No JVD present.   Cardiovascular: Normal rate, regular rhythm, S1 normal, S2 normal and intact distal pulses.  No extrasystoles are present. Exam reveals no gallop and no friction rub.   No murmur heard.  Pulses:       Radial pulses are 2+ on the right side, and 2+ on the left side.        Dorsalis pedis pulses are 2+ on the right side, and 2+ on the left side.        Posterior tibial pulses are 2+ on the right side, and 2+ on the left side.   Pulmonary/Chest: Effort normal. No accessory muscle usage. Tachypnea noted. No respiratory distress. She has wheezes (diffuse). She has no rhonchi. She has no rales.   Decreased air entry.   Abdominal: Soft. Bowel sounds are normal. She exhibits no distension. There is no tenderness. There is no rebound, no guarding and no CVA tenderness.   Musculoskeletal: Normal range of motion. She exhibits no edema, tenderness or deformity.   Neurological: She is alert and oriented to person, place, and time. No cranial nerve deficit or sensory deficit.   Skin: Skin is warm, dry and intact. Capillary refill takes less than 2 seconds. No rash noted. She is not diaphoretic. No cyanosis or erythema.   Psychiatric: She has a normal mood and affect. Her speech is normal and behavior is normal. Cognition and memory are normal.   Nursing note and vitals reviewed.          Significant Labs:   Results for orders placed or performed during the hospital  encounter of 01/27/19   Influenza A & B by Molecular   Result Value Ref Range    Influenza A, Molecular Negative Negative    Influenza B, Molecular Negative Negative    Flu A & B Source NP    CBC auto differential   Result Value Ref Range    WBC 6.48 3.90 - 12.70 K/uL    RBC 5.06 4.00 - 5.40 M/uL    Hemoglobin 16.6 (H) 12.0 - 16.0 g/dL    Hematocrit 51.0 (H) 37.0 - 48.5 %     (H) 82 - 98 fL    MCH 32.8 (H) 27.0 - 31.0 pg    MCHC 32.5 32.0 - 36.0 g/dL    RDW 13.1 11.5 - 14.5 %    Platelets 143 (L) 150 - 350 K/uL    MPV 10.5 9.2 - 12.9 fL    Gran # (ANC) 4.3 1.8 - 7.7 K/uL    Lymph # 1.2 1.0 - 4.8 K/uL    Mono # 0.8 0.3 - 1.0 K/uL    Eos # 0.1 0.0 - 0.5 K/uL    Baso # 0.05 0.00 - 0.20 K/uL    Gran% 66.6 38.0 - 73.0 %    Lymph% 19.0 18.0 - 48.0 %    Mono% 12.5 4.0 - 15.0 %    Eosinophil% 0.9 0.0 - 8.0 %    Basophil% 0.8 0.0 - 1.9 %    Differential Method Automated    Comprehensive metabolic panel   Result Value Ref Range    Sodium 140 136 - 145 mmol/L    Potassium 3.7 3.5 - 5.1 mmol/L    Chloride 101 95 - 110 mmol/L    CO2 30 (H) 23 - 29 mmol/L    Glucose 115 (H) 70 - 110 mg/dL    BUN, Bld 21 8 - 23 mg/dL    Creatinine 0.8 0.5 - 1.4 mg/dL    Calcium 9.6 8.7 - 10.5 mg/dL    Total Protein 7.6 6.0 - 8.4 g/dL    Albumin 3.5 3.5 - 5.2 g/dL    Total Bilirubin 0.6 0.1 - 1.0 mg/dL    Alkaline Phosphatase 76 55 - 135 U/L    AST 20 10 - 40 U/L    ALT 15 10 - 44 U/L    Anion Gap 9 8 - 16 mmol/L    eGFR if African American >60.0 >60 mL/min/1.73 m^2    eGFR if non African American >60.0 >60 mL/min/1.73 m^2   Lactic acid, plasma   Result Value Ref Range    Lactate (Lactic Acid) 1.6 0.5 - 2.2 mmol/L   Urinalysis, Reflex to Urine Culture Urine, Clean Catch   Result Value Ref Range    Specimen UA Urine, Clean Catch     Color, UA Yellow Yellow, Straw, Yanique    Appearance, UA Clear Clear    pH, UA 6.0 5.0 - 8.0    Specific Gravity, UA 1.020 1.005 - 1.030    Protein, UA 2+ (A) Negative    Glucose, UA Negative Negative    Ketones,  UA Negative Negative    Bilirubin (UA) Negative Negative    Occult Blood UA Trace (A) Negative    Nitrite, UA Negative Negative    Urobilinogen, UA Negative <2.0 EU/dL    Leukocytes, UA Negative Negative   Troponin I   Result Value Ref Range    Troponin I <0.006 0.000 - 0.026 ng/mL   B-Type natriuretic peptide (BNP)   Result Value Ref Range     (H) 0 - 99 pg/mL   Protime-INR   Result Value Ref Range    Prothrombin Time 11.0 9.0 - 12.5 sec    INR 1.1 0.8 - 1.2   APTT   Result Value Ref Range    aPTT 26.6 21.0 - 32.0 sec   CK   Result Value Ref Range    CPK 52 20 - 180 U/L   CK-MB   Result Value Ref Range    CPK 52 20 - 180 U/L    CPK MB 0.8 0.1 - 6.5 ng/mL    MB% 1.5 0.0 - 5.0 %   Urinalysis Microscopic   Result Value Ref Range    RBC, UA 3 0 - 4 /hpf    WBC, UA 1 0 - 5 /hpf    Bacteria, UA Occasional None-Occ /hpf    Hyaline Casts, UA 1 0-1/lpf /lpf    Microscopic Comment SEE COMMENT    ISTAT PROCEDURE   Result Value Ref Range    POC PH 7.350 7.35 - 7.45    POC PCO2 51.3 (H) 35 - 45 mmHg    POC PO2 43 (LL) 80 - 100 mmHg    POC HCO3 28.3 (H) 24 - 28 mmol/L    POC BE 3 -2 to 2 mmol/L    POC SATURATED O2 75 (L) 95 - 100 %    Sample ARTERIAL     Site RR     Allens Test Pass     DelSys Room Air     Mode SPONT     FiO2 21       All pertinent labs within the past 24 hours have been reviewed.    Significant Imaging:   Imaging Results          CTA Chest Non-Coronary (PE Study) (Final result)  Result time 01/27/19 14:20:58    Final result by Stefan Phillips Jr., MD (01/27/19 14:20:58)                 Impression:      No pulmonary emboli are seen.  No acute pulmonary parenchymal findings.  Scattered small lymph nodes, as above.    All CT scans at this facility are performed  using dose modulation techniques as appropriate to performed exam including the following:  automated exposure control; adjustment of mA and/or kV according to the patients size (this includes techniques or standardized protocols for targeted  exams where dose is matched to indication/reason for exam: i.e. extremities or head);  iterative reconstruction technique.      Electronically signed by: Chris Reaves MD  Date:    01/27/2019  Time:    14:20             Narrative:    EXAMINATION:  CTA CHEST NON CORONARY    CLINICAL HISTORY:  Dyspnea, cardiac origin suspected;    TECHNIQUE:  Postcontrast axial images of the chest were obtained.   Omnipaque 350 was administered.  Multiplanar thick slab MIP re-formatted images were produced and reviewed.    COMPARISON:  No comparison studies available.    FINDINGS:  Angiogram: Good opacification of the pulmonary arterial system.  No central pulmonary emboli.  No peripheral pulmonary emboli.    Pulmonary: No infiltrates or effusion.  No suspicious pulmonary mass.  Aortic and coronary artery calcifications.  No pericardial fluid.  Scattered mediastinal lymph nodes are present, largest near the AP window region, measuring 2.0 cm.  Small bilateral hilar lymph nodes, largest on the right, measuring 1.5 cm.  No acute upper abdominal findings.  Small retrocrural lymph nodes.                               X-Ray Chest PA And Lateral (Final result)  Result time 01/27/19 12:29:31    Final result by Dm Andre MD (01/27/19 12:29:31)                 Impression:      1.  There is similar degree and distribution of interstitial changes and vascular congestion throughout the lungs.  This could be related to chronic underlying interstitial lung disease versus recurrent interstitial changes throughout the lungs, which could include vascular congestion/pulmonary edema or interstitial infectious process.  Clinical correlation is advised.    2.  Other stable findings as noted above.      Electronically signed by: Dm Andre MD  Date:    01/27/2019  Time:    12:29             Narrative:    EXAMINATION:  XR CHEST PA AND LATERAL    CLINICAL HISTORY:  Cough;    COMPARISON:  March 22, 2016    FINDINGS:  EKG leads overlie the chest  which is rotated to the right.  There is a similar degree in distribution of interstitial changes and vascular congestion throughout the lungs.  The lungs are free of new pulmonary opacities.  The cardiac silhouette size is enlarged.  The trachea is midline and the mediastinal width is normal. Negative for effusion or pneumothorax.  Negative for osseous abnormalities. Ectatic and tortuous aorta.  Calcifications in the aortic knob.  Marginal spondylosis.  Degenerative changes of the shoulder girdles.                               I have reviewed all pertinent imaging results/findings within the past 24 hours.     EKG: (personally reviewed)  Normal sinus rhythm, nonspecific ST-T abnormality, occasional PAC.  No previous to compare.              Assessment/Plan:     * Asthma with acute exacerbation    - O2 sat stable on NC supplementation, wean as tolerated.  - Continue bronchodilators.  - Will add Singulair.  - Solumedrol 80mg Q8.  - Empiric PO azithromycin.     Acute respiratory failure with hypoxia and hypercapnia    - Secondary to above.  Patient with probable undiagnosed COPD.  - Continue plan as above.  - Will need OP referral to Pulmonology.  - Repeat ABG in AM.     Essential hypertension    - Hypertensive in ED, but stable.  - Resume home antihypertensives.       VTE Risk Mitigation (From admission, onward)        Ordered     enoxaparin injection 40 mg  Daily      01/27/19 2137     IP VTE LOW RISK PATIENT  Once      01/27/19 2137             Natasha Mensah NP  Department of Hospital Medicine   [facility]

## 2019-01-29 DIAGNOSIS — G47.33 OSA (OBSTRUCTIVE SLEEP APNEA): Primary | ICD-10-CM

## 2019-01-29 DIAGNOSIS — J96.01 ACUTE RESPIRATORY FAILURE WITH HYPOXIA AND HYPERCARBIA: ICD-10-CM

## 2019-01-29 DIAGNOSIS — J96.02 ACUTE RESPIRATORY FAILURE WITH HYPOXIA AND HYPERCARBIA: ICD-10-CM

## 2019-01-29 PROBLEM — R29.818 SUSPECTED SLEEP APNEA: Status: ACTIVE | Noted: 2019-01-29

## 2019-01-29 PROBLEM — R79.89 ELEVATED BRAIN NATRIURETIC PEPTIDE (BNP) LEVEL: Status: ACTIVE | Noted: 2019-01-29

## 2019-01-29 LAB
ANION GAP SERPL CALC-SCNC: 15 MMOL/L
BASOPHILS # BLD AUTO: 0.01 K/UL
BASOPHILS NFR BLD: 0.1 %
BUN SERPL-MCNC: 51 MG/DL
CALCIUM SERPL-MCNC: 9.7 MG/DL
CHLORIDE SERPL-SCNC: 96 MMOL/L
CO2 SERPL-SCNC: 28 MMOL/L
CREAT SERPL-MCNC: 1 MG/DL
DIFFERENTIAL METHOD: ABNORMAL
EOSINOPHIL # BLD AUTO: 0 K/UL
EOSINOPHIL NFR BLD: 0 %
ERYTHROCYTE [DISTWIDTH] IN BLOOD BY AUTOMATED COUNT: 13.3 %
EST. GFR  (AFRICAN AMERICAN): >60 ML/MIN/1.73 M^2
EST. GFR  (NON AFRICAN AMERICAN): 58 ML/MIN/1.73 M^2
GLUCOSE SERPL-MCNC: 217 MG/DL
HCT VFR BLD AUTO: 50 %
HGB BLD-MCNC: 16.7 G/DL
LYMPHOCYTES # BLD AUTO: 1.7 K/UL
LYMPHOCYTES NFR BLD: 11.2 %
MCH RBC QN AUTO: 34 PG
MCHC RBC AUTO-ENTMCNC: 33.4 G/DL
MCV RBC AUTO: 102 FL
MONOCYTES # BLD AUTO: 0.3 K/UL
MONOCYTES NFR BLD: 1.9 %
NEUTROPHILS # BLD AUTO: 13 K/UL
NEUTROPHILS NFR BLD: 86.8 %
PLATELET # BLD AUTO: 159 K/UL
PMV BLD AUTO: 10.4 FL
POTASSIUM SERPL-SCNC: 3.9 MMOL/L
RBC # BLD AUTO: 4.91 M/UL
SODIUM SERPL-SCNC: 139 MMOL/L
T4 FREE SERPL-MCNC: 0.93 NG/DL
TSH SERPL DL<=0.005 MIU/L-ACNC: 0.31 UIU/ML
WBC # BLD AUTO: 14.93 K/UL

## 2019-01-29 PROCEDURE — 84439 ASSAY OF FREE THYROXINE: CPT

## 2019-01-29 PROCEDURE — 85025 COMPLETE CBC W/AUTO DIFF WBC: CPT

## 2019-01-29 PROCEDURE — 80048 BASIC METABOLIC PNL TOTAL CA: CPT

## 2019-01-29 PROCEDURE — 25000242 PHARM REV CODE 250 ALT 637 W/ HCPCS: Performed by: EMERGENCY MEDICINE

## 2019-01-29 PROCEDURE — 84443 ASSAY THYROID STIM HORMONE: CPT

## 2019-01-29 PROCEDURE — 25000242 PHARM REV CODE 250 ALT 637 W/ HCPCS: Performed by: NURSE PRACTITIONER

## 2019-01-29 PROCEDURE — 36415 COLL VENOUS BLD VENIPUNCTURE: CPT

## 2019-01-29 PROCEDURE — 99223 1ST HOSP IP/OBS HIGH 75: CPT | Mod: ,,, | Performed by: INTERNAL MEDICINE

## 2019-01-29 PROCEDURE — 94640 AIRWAY INHALATION TREATMENT: CPT

## 2019-01-29 PROCEDURE — 99900035 HC TECH TIME PER 15 MIN (STAT)

## 2019-01-29 PROCEDURE — 94799 UNLISTED PULMONARY SVC/PX: CPT

## 2019-01-29 PROCEDURE — 63600175 PHARM REV CODE 636 W HCPCS: Performed by: EMERGENCY MEDICINE

## 2019-01-29 PROCEDURE — 25000242 PHARM REV CODE 250 ALT 637 W/ HCPCS: Performed by: INTERNAL MEDICINE

## 2019-01-29 PROCEDURE — 99223 PR INITIAL HOSPITAL CARE,LEVL III: ICD-10-PCS | Mod: ,,, | Performed by: INTERNAL MEDICINE

## 2019-01-29 PROCEDURE — 63600175 PHARM REV CODE 636 W HCPCS: Performed by: NURSE PRACTITIONER

## 2019-01-29 PROCEDURE — 27000221 HC OXYGEN, UP TO 24 HOURS

## 2019-01-29 PROCEDURE — 94761 N-INVAS EAR/PLS OXIMETRY MLT: CPT

## 2019-01-29 PROCEDURE — 25000003 PHARM REV CODE 250: Performed by: NURSE PRACTITIONER

## 2019-01-29 PROCEDURE — 63600175 PHARM REV CODE 636 W HCPCS: Performed by: INTERNAL MEDICINE

## 2019-01-29 PROCEDURE — 21400001 HC TELEMETRY ROOM

## 2019-01-29 RX ORDER — FORMOTEROL FUMARATE DIHYDRATE 20 UG/2ML
20 SOLUTION RESPIRATORY (INHALATION) EVERY 12 HOURS
Status: DISCONTINUED | OUTPATIENT
Start: 2019-01-29 | End: 2019-01-29 | Stop reason: CLARIF

## 2019-01-29 RX ORDER — ARFORMOTEROL TARTRATE 15 UG/2ML
15 SOLUTION RESPIRATORY (INHALATION) EVERY 12 HOURS
Status: DISCONTINUED | OUTPATIENT
Start: 2019-01-29 | End: 2019-01-30 | Stop reason: HOSPADM

## 2019-01-29 RX ORDER — FUROSEMIDE 10 MG/ML
20 INJECTION INTRAMUSCULAR; INTRAVENOUS 2 TIMES DAILY
Status: DISCONTINUED | OUTPATIENT
Start: 2019-01-29 | End: 2019-01-30 | Stop reason: HOSPADM

## 2019-01-29 RX ADMIN — FUROSEMIDE 20 MG: 10 INJECTION, SOLUTION INTRAVENOUS at 05:01

## 2019-01-29 RX ADMIN — BUDESONIDE 0.5 MG: 0.5 SUSPENSION RESPIRATORY (INHALATION) at 08:01

## 2019-01-29 RX ADMIN — ARFORMOTEROL TARTRATE 15 MCG: 15 SOLUTION RESPIRATORY (INHALATION) at 08:01

## 2019-01-29 RX ADMIN — ENOXAPARIN SODIUM 40 MG: 100 INJECTION SUBCUTANEOUS at 05:01

## 2019-01-29 RX ADMIN — ATENOLOL 50 MG: 50 TABLET ORAL at 08:01

## 2019-01-29 RX ADMIN — IPRATROPIUM BROMIDE AND ALBUTEROL SULFATE 3 ML: .5; 3 SOLUTION RESPIRATORY (INHALATION) at 01:01

## 2019-01-29 RX ADMIN — METHYLPREDNISOLONE SODIUM SUCCINATE 80 MG: 40 INJECTION, POWDER, FOR SOLUTION INTRAMUSCULAR; INTRAVENOUS at 02:01

## 2019-01-29 RX ADMIN — AZITHROMYCIN 250 MG: 250 TABLET, FILM COATED ORAL at 08:01

## 2019-01-29 RX ADMIN — METHYLPREDNISOLONE SODIUM SUCCINATE 80 MG: 40 INJECTION, POWDER, FOR SOLUTION INTRAMUSCULAR; INTRAVENOUS at 05:01

## 2019-01-29 RX ADMIN — METHYLPREDNISOLONE SODIUM SUCCINATE 40 MG: 40 INJECTION, POWDER, FOR SOLUTION INTRAMUSCULAR; INTRAVENOUS at 09:01

## 2019-01-29 RX ADMIN — CYANOCOBALAMIN TAB 250 MCG 250 MCG: 250 TAB at 08:01

## 2019-01-29 RX ADMIN — IPRATROPIUM BROMIDE AND ALBUTEROL SULFATE 3 ML: .5; 3 SOLUTION RESPIRATORY (INHALATION) at 08:01

## 2019-01-29 RX ADMIN — MONTELUKAST SODIUM 10 MG: 10 TABLET, FILM COATED ORAL at 08:01

## 2019-01-29 RX ADMIN — OXYCODONE HYDROCHLORIDE AND ACETAMINOPHEN 1000 MG: 500 TABLET ORAL at 08:01

## 2019-01-29 RX ADMIN — PANTOPRAZOLE SODIUM 40 MG: 40 TABLET, DELAYED RELEASE ORAL at 08:01

## 2019-01-29 NOTE — PLAN OF CARE
Problem: Adult Inpatient Plan of Care  Goal: Plan of Care Review  Outcome: Ongoing (interventions implemented as appropriate)  POC discussed with patient, verbalized understanding.   NSR 66-84 on monitor. VSS. AAO X 4. Patient continent X2.  Ambulatory without assist to bathroom.  Turns independently in bed.  Fall precautions in place. Side rails up X2.    Call bell on, working and within reach.   Bed locked in low position. Remains free from falls/injuries.   Denies needs at this time. Will continue to monitor.

## 2019-01-29 NOTE — SUBJECTIVE & OBJECTIVE
Review of Systems   Constitutional: Positive for activity change. Negative for chills, diaphoresis, fatigue, fever and unexpected weight change.   HENT: Positive for congestion, rhinorrhea, sinus pressure and sinus pain. Negative for sore throat and trouble swallowing.    Eyes: Negative for visual disturbance.   Respiratory: Positive for cough and shortness of breath. Negative for chest tightness and wheezing.    Cardiovascular: Negative for chest pain, palpitations and leg swelling.   Gastrointestinal: Negative for abdominal distention, abdominal pain, blood in stool, constipation, diarrhea, nausea and vomiting.   Genitourinary: Negative for dysuria and hematuria.   Musculoskeletal: Negative for arthralgias, back pain and myalgias.   Skin: Negative for pallor, rash and wound.   Neurological: Negative for dizziness, syncope, weakness, light-headedness, numbness and headaches.   Psychiatric/Behavioral: Negative for confusion. The patient is not nervous/anxious.    All other systems reviewed and are negative.    Objective:     Vital Signs (Most Recent):  Temp: 98.4 °F (36.9 °C) (01/29/19 1534)  Pulse: 67 (01/29/19 1534)  Resp: 18 (01/29/19 1534)  BP: (!) 143/72 (01/29/19 1534)  SpO2: (!) 91 % (01/29/19 1534) Vital Signs (24h Range):  Temp:  [96.1 °F (35.6 °C)-98.4 °F (36.9 °C)] 98.4 °F (36.9 °C)  Pulse:  [65-82] 67  Resp:  [17-20] 18  SpO2:  [85 %-97 %] 91 %  BP: (129-169)/(62-79) 143/72     Weight: 120 kg (264 lb 8.8 oz)  Body mass index is 45.41 kg/m².    Intake/Output Summary (Last 24 hours) at 1/29/2019 1734  Last data filed at 1/29/2019 1700  Gross per 24 hour   Intake 660 ml   Output --   Net 660 ml      Physical Exam   Constitutional: She is oriented to person, place, and time. She appears well-developed and well-nourished. No distress.   Obese   HENT:   Head: Normocephalic and atraumatic.   Nose: Right sinus exhibits frontal sinus tenderness. Left sinus exhibits frontal sinus tenderness.   Eyes: Conjunctivae  are normal.   PERRL; EOM intact.   Neck: Normal range of motion. Neck supple. No JVD present.   Cardiovascular: Normal rate, regular rhythm, S1 normal, S2 normal and intact distal pulses.  No extrasystoles are present. Exam reveals no gallop and no friction rub.   No murmur heard.  Pulses:       Radial pulses are 2+ on the right side, and 2+ on the left side.        Dorsalis pedis pulses are 2+ on the right side, and 2+ on the left side.        Posterior tibial pulses are 2+ on the right side, and 2+ on the left side.   Pulmonary/Chest: Effort normal. No accessory muscle usage or stridor. No tachypnea. No respiratory distress. She has decreased breath sounds in the right upper field, the right middle field, the right lower field, the left upper field and the left lower field. She has no wheezes. She has no rhonchi. She has no rales. She exhibits no tenderness.   Abdominal: Soft. Bowel sounds are normal. She exhibits no distension. There is no tenderness. There is no rebound, no guarding and no CVA tenderness.   Genitourinary:   Genitourinary Comments: Deferred   Musculoskeletal: Normal range of motion. She exhibits no edema, tenderness or deformity.   Neurological: She is alert and oriented to person, place, and time. No cranial nerve deficit or sensory deficit.   Skin: Skin is warm, dry and intact. Capillary refill takes less than 2 seconds. No rash noted. She is not diaphoretic. No cyanosis or erythema.   Psychiatric: She has a normal mood and affect. Her speech is normal and behavior is normal. Judgment and thought content normal. Cognition and memory are normal.   Nursing note and vitals reviewed.      Significant Labs:   BMP:   Recent Labs   Lab 01/29/19  0922   *      K 3.9   CL 96   CO2 28   BUN 51*   CREATININE 1.0   CALCIUM 9.7     CBC:   Recent Labs   Lab 01/29/19  0922   WBC 14.93*   HGB 16.7*   HCT 50.0*          Significant Imaging: I have reviewed all pertinent imaging  results/findings within the past 24 hours.

## 2019-01-29 NOTE — PROGRESS NOTES
[facility] Hospital Medicine  Progress Note    Patient Name: Luisa Colon  MRN: [id number]  Patient Class: IP- Inpatient   Admission Date: 1/27/2019  Length of Stay: 2 days  Attending Physician: Mckay Sommer MD  Primary Care Provider: Jan Oconnor MD        Subjective:     Principal Problem:Acute respiratory failure with hypoxia and hypercapnia    HPI:  Ms. Colon is a 67yo female with a PMHx of asthma/chronic bronchitis, HTN, and 30 pack-year h/o tobacco smoking (quit 14 years ago), who presented to the ED with c/o SOB that has progressively worsened over the past 1 day.  Associated nonproductive cough, congestion, rhinorrhea, fever (TMax 100.6), and malaise.  No aggravating or alleviating factors.  Denies any CP, palpitations, orthopnea, PND, edema, ABD pain, N/V/D, dysuria, back pain, HA, lightheadedness, dizziness, syncope, sore throat, weakness, or chills.  Initial work-up resulted normal WBC, influenza negative, CXR unremarkable, CTA of chest negative for acute process.  ABG showed pH 7.35, pCO2 51.3, pO2 43, HCO3 28.3, SaO2 75 on RA.  Patient received Duonebs x 3 and IV Solumedrol 125mg, and placed on 5L NC in ED.  Patient reports SOB improved, and O2 sat remained stable.  Hospital Medicine was called for admission.        Hospital Course:  Luisa Colon is a 68 year old female admitted for Asthma exacerbation. Pt currently receiving IV steroids, inhaled medications and PO Azithromycin. BBS are improved, slight scattered wheezing noted posteriorly. Pt currently on 2LPM. Denies SOB. Reports SOB much improved. Will attempt to wean O2. If unsuccessful, will need to obtain home oxygen evaluation prior to discharge. Hx of tobacco abuse. Quit smoking in 2005. Smoked over 40 years. Will refer to pulmonologist upon discharge for PFTs for possible underlying COPD and TREVOR workup. As of 01/29 Leukocytosis noted on labs, WBC count 14.93K from 6.48K. Remains afebrile. Likely secondary to IV  steroids. Pt appears more dyspneic today. BBS are clear but diminished, no wheezes, rales, or rhonchi heard. Pt currently on 5LPM. Pt likely has undiagnosed COPD. Will consult Pulmonology, input appreciated.       Review of Systems   Constitutional: Positive for activity change. Negative for chills, diaphoresis, fatigue, fever and unexpected weight change.   HENT: Positive for congestion, rhinorrhea, sinus pressure and sinus pain. Negative for sore throat and trouble swallowing.    Eyes: Negative for visual disturbance.   Respiratory: Positive for cough and shortness of breath. Negative for chest tightness and wheezing.    Cardiovascular: Negative for chest pain, palpitations and leg swelling.   Gastrointestinal: Negative for abdominal distention, abdominal pain, blood in stool, constipation, diarrhea, nausea and vomiting.   Genitourinary: Negative for dysuria and hematuria.   Musculoskeletal: Negative for arthralgias, back pain and myalgias.   Skin: Negative for pallor, rash and wound.   Neurological: Negative for dizziness, syncope, weakness, light-headedness, numbness and headaches.   Psychiatric/Behavioral: Negative for confusion. The patient is not nervous/anxious.    All other systems reviewed and are negative.    Objective:     Vital Signs (Most Recent):  Temp: 98.4 °F (36.9 °C) (01/29/19 1534)  Pulse: 67 (01/29/19 1534)  Resp: 18 (01/29/19 1534)  BP: (!) 143/72 (01/29/19 1534)  SpO2: (!) 91 % (01/29/19 1534) Vital Signs (24h Range):  Temp:  [96.1 °F (35.6 °C)-98.4 °F (36.9 °C)] 98.4 °F (36.9 °C)  Pulse:  [65-82] 67  Resp:  [17-20] 18  SpO2:  [85 %-97 %] 91 %  BP: (129-169)/(62-79) 143/72     Weight: 120 kg (264 lb 8.8 oz)  Body mass index is 45.41 kg/m².    Intake/Output Summary (Last 24 hours) at 1/29/2019 5204  Last data filed at 1/29/2019 1700  Gross per 24 hour   Intake 660 ml   Output --   Net 660 ml      Physical Exam   Constitutional: She is oriented to person, place, and time. She appears  well-developed and well-nourished. No distress.   Obese   HENT:   Head: Normocephalic and atraumatic.   Nose: Right sinus exhibits frontal sinus tenderness. Left sinus exhibits frontal sinus tenderness.   Eyes: Conjunctivae are normal.   PERRL; EOM intact.   Neck: Normal range of motion. Neck supple. No JVD present.   Cardiovascular: Normal rate, regular rhythm, S1 normal, S2 normal and intact distal pulses.  No extrasystoles are present. Exam reveals no gallop and no friction rub.   No murmur heard.  Pulses:       Radial pulses are 2+ on the right side, and 2+ on the left side.        Dorsalis pedis pulses are 2+ on the right side, and 2+ on the left side.        Posterior tibial pulses are 2+ on the right side, and 2+ on the left side.   Pulmonary/Chest: Effort normal. No accessory muscle usage or stridor. No tachypnea. No respiratory distress. She has decreased breath sounds in the right upper field, the right middle field, the right lower field, the left upper field and the left lower field. She has no wheezes. She has no rhonchi. She has no rales. She exhibits no tenderness.   Abdominal: Soft. Bowel sounds are normal. She exhibits no distension. There is no tenderness. There is no rebound, no guarding and no CVA tenderness.   Genitourinary:   Genitourinary Comments: Deferred   Musculoskeletal: Normal range of motion. She exhibits no edema, tenderness or deformity.   Neurological: She is alert and oriented to person, place, and time. No cranial nerve deficit or sensory deficit.   Skin: Skin is warm, dry and intact. Capillary refill takes less than 2 seconds. No rash noted. She is not diaphoretic. No cyanosis or erythema.   Psychiatric: She has a normal mood and affect. Her speech is normal and behavior is normal. Judgment and thought content normal. Cognition and memory are normal.   Nursing note and vitals reviewed.      Significant Labs:   BMP:   Recent Labs   Lab 01/29/19  0922   *      K 3.9    CL 96   CO2 28   BUN 51*   CREATININE 1.0   CALCIUM 9.7     CBC:   Recent Labs   Lab 01/29/19  0922   WBC 14.93*   HGB 16.7*   HCT 50.0*          Significant Imaging: I have reviewed all pertinent imaging results/findings within the past 24 hours.    Assessment/Plan:      * Acute respiratory failure with hypoxia and hypercapnia    - Secondary to asthma excerbation.  Patient with probable undiagnosed COPD. Hx of tobacco abuse, quit in 2005. Smoked > 40 years  - Inhaled medications  - Supplemental oxygen, keep O2 sats > 90% -- will attempt to wean. If unable to wean from O2, obtain home oxygen evaluation  - Will need OP referral to Pulmonology -- PFTs for possible underlying COPD and TREVOR  - Repeat ABG in AM -- improved     Asthma with acute exacerbation    - O2 sat stable on NC supplementation, wean as tolerated  - Continue bronchodilators  - Continue Singulair  - Solumedrol 80mg Q8  - Empiric PO azithromycin     Essential hypertension    - Resume home antihypertensive including Atenolol        VTE Risk Mitigation (From admission, onward)        Ordered     Place sequential compression device  Until discontinued      01/28/19 0353     enoxaparin injection 40 mg  Daily      01/27/19 2137     IP VTE LOW RISK PATIENT  Once      01/27/19 2137              CECE Dailey  Department of Hospital Medicine   [facility]

## 2019-01-29 NOTE — CONSULTS
[facility]   Pulmonology  Consult Note    Patient Name: Luisa Cloon  MRN: [id number]  Admission Date: 1/27/2019  Hospital Length of Stay: 2 days  Code Status: Full Code  Attending Physician: Mckay Sommer MD  Primary Care Provider: Jan Oconnor MD   Principal Problem: Acute respiratory failure with hypoxia and hypercapnia      Subjective:     HPI:  Ms Luisa Colon is 68 years old  Admitted 01/27 with cough, SOB and wheezing  Child long asthma on Advair 100/50, stable  Thinks exposure to chocolate triggered flare  Former smoker 1PPD 40 years quit 2005.  No prior spirometry. No intubations or recent other asthma flares.  Never been on oxygen  At admission, pCO2 was 51, BNP elevated and CXR and chest Ct suggest interstial edema  Also loud snoring, witnessed apnea and day time sleepiness.  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 No pertinent surgical history.    Review of patient's allergies indicates:  No Known Allergies    Family History     None        Tobacco Use    Smoking status: Former Smoker    Smokeless tobacco: Never Used   Substance and Sexual Activity    Alcohol use: No     Frequency: Never    Drug use: No    Sexual activity: Not on file         Review of Systems   Constitutional: Positive for fatigue.   HENT: Negative.    Respiratory: Positive for apnea and shortness of breath.    Cardiovascular: Negative.    Gastrointestinal: Negative.    Endocrine: Negative.    Genitourinary: Negative.    Musculoskeletal: Negative.    Allergic/Immunologic: Negative.    Hematological: Negative.    Psychiatric/Behavioral: Negative.      Objective:     Vital Signs (Most Recent):  Temp: 98.4 °F (36.9 °C) (01/29/19 1534)  Pulse: 67 (01/29/19 1534)  Resp: 18 (01/29/19 1534)  BP: (!) 143/72 (01/29/19 1534)  SpO2: (!) 91 % (01/29/19  1534) Vital Signs (24h Range):  Temp:  [96.1 °F (35.6 °C)-98.4 °F (36.9 °C)] 98.4 °F (36.9 °C)  Pulse:  [65-82] 67  Resp:  [17-20] 18  SpO2:  [85 %-97 %] 91 %  BP: (129-169)/(62-79) 143/72     Weight: 120 kg (264 lb 8.8 oz)  Body mass index is 45.41 kg/m².      Intake/Output Summary (Last 24 hours) at 1/29/2019 1542  Last data filed at 1/29/2019 1300  Gross per 24 hour   Intake 600 ml   Output --   Net 600 ml       Physical Exam   Constitutional: She is oriented to person, place, and time. She appears well-developed and well-nourished.   HENT:   Head: Normocephalic and atraumatic.   Nose: Nose normal.   Mouth/Throat: Oropharynx is clear and moist. No oropharyngeal exudate.   Eyes: Conjunctivae and EOM are normal. Pupils are equal, round, and reactive to light. Left eye exhibits no discharge. No scleral icterus.   Neck: Normal range of motion. Neck supple. No tracheal deviation present. No thyromegaly present.   Cardiovascular: Normal rate, regular rhythm and normal heart sounds.   No murmur heard.  Pulmonary/Chest: Effort normal. She has decreased breath sounds. She has no wheezes. She has no rhonchi. She has no rales.   Abdominal: Soft. Bowel sounds are normal.   Musculoskeletal: Normal range of motion. She exhibits no edema.   Neurological: She is alert and oriented to person, place, and time. No cranial nerve deficit.   Skin: Skin is warm and dry. Capillary refill takes 2 to 3 seconds.   Psychiatric: She has a normal mood and affect.   Nursing note and vitals reviewed.      Vents:  Oxygen Concentration (%): 40 (01/28/19 2103)    Lines/Drains/Airways          None          Significant Labs:    CBC/Anemia Profile:  Recent Labs   Lab 01/29/19  0922   WBC 14.93*   HGB 16.7*   HCT 50.0*      *   RDW 13.3        Chemistries:  Recent Labs   Lab 01/29/19  0922      K 3.9   CL 96   CO2 28   BUN 51*   CREATININE 1.0   CALCIUM 9.7       ABGs:   Recent Labs   Lab 01/28/19  0903   PH 7.328*   PCO2 60.3*    HCO3 31.7*   POCSATURATED 88*   BE 6     Cardiac Markers: No results for input(s): CKMB, TROPONINT, MYOGLOBIN in the last 48 hours.  Respiratory Culture: No results for input(s): GSRESP, RESPIRATORYC in the last 48 hours.  All pertinent labs within the past 24 hours have been reviewed.    Significant Imaging:   I have reviewed and interpreted all pertinent imaging results/findings within the past 24 hours.     Chest CT  Angiogram: Good opacification of the pulmonary arterial system.  No central pulmonary emboli.  No peripheral pulmonary emboli.    Pulmonary: No infiltrates or effusion.  No suspicious pulmonary mass.  Aortic and coronary artery calcifications.  No pericardial fluid.  Scattered mediastinal lymph nodes are present, largest near the AP window region, measuring 2.0 cm.  Small bilateral hilar lymph nodes, largest on the right, measuring 1.5 cm.  No acute upper abdominal findings.  Small retrocrural lymph nodes.      Impression       No pulmonary emboli are seen.  No acute pulmonary parenchymal findings.  Scattered small lymph nodes, as above.           ABG  Recent Labs   Lab 01/28/19  0903   PH 7.328*   PO2 60*   PCO2 60.3*   HCO3 31.7*   BE 6     Assessment/Plan:     * Acute respiratory failure with hypoxia and hypercapnia    Hypoxemia likely from ILD/COPD smoking related. Keep SpO2 > 90% NC oxygen  Need out patient PFT,   Hypercapnia: Obesity hypoventilation, Occult obstructive lung disease,   Will need eval for TREVOR, likely benefit from BIPAP       Suspected sleep apnea    Sleep study     Asthma with acute exacerbation    IV Steriods  Bronchodilators; Short acting beta agonist  Neb Budesonide and Formoterol  Establish care in office     Elevated brain natriuretic peptide (BNP) level    IV Lasix x 4 doses     BMI 45.0-49.9, adult    Weight loss           Thank you for your consult. I will follow-up with patient. Please contact us if you have any additional questions.     Favian Moise,  MD  Pulmonology  [facility]

## 2019-01-29 NOTE — ASSESSMENT & PLAN NOTE
Hypoxemia likely from ILD/COPD smoking related. Keep SpO2 > 90% NC oxygen  Need out patient PFT,   Hypercapnia: Obesity hypoventilation, Occult obstructive lung disease,   Will need eval for TREVOR, likely benefit from BIPAP

## 2019-01-29 NOTE — ASSESSMENT & PLAN NOTE
IV Steriods  Bronchodilators; Short acting beta agonist  Neb Budesonide and Formoterol  Establish care in office

## 2019-01-29 NOTE — PROGRESS NOTES

## 2019-01-29 NOTE — HPI
Ms Luisa Colon is 68 years old  Admitted 01/27 with cough, SOB and wheezing  Child long asthma on Advair 100/50, stable  Thinks exposure to chocolate triggered flare  Former smoker 1PPD 40 years quit 2005.  No prior spirometry. No intubations or recent other asthma flares.  Never been on oxygen  At admission, pCO2 was 51, BNP elevated and CXR and chest Ct suggest interstial edema  Also loud snoring, witnessed apnea and day time sleepiness.  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

## 2019-01-29 NOTE — HOSPITAL COURSE
01/29: somewhat better except for need for O2, Resting SpO2 was 85%  01/30: At baseline, anxious to go home, sleep study ordered

## 2019-01-30 VITALS
WEIGHT: 280.19 LBS | TEMPERATURE: 98 F | SYSTOLIC BLOOD PRESSURE: 145 MMHG | DIASTOLIC BLOOD PRESSURE: 85 MMHG | RESPIRATION RATE: 20 BRPM | BODY MASS INDEX: 47.83 KG/M2 | HEIGHT: 64 IN | HEART RATE: 91 BPM | OXYGEN SATURATION: 95 %

## 2019-01-30 LAB
ANION GAP SERPL CALC-SCNC: 17 MMOL/L
BASOPHILS # BLD AUTO: 0.02 K/UL
BASOPHILS NFR BLD: 0.1 %
BUN SERPL-MCNC: 56 MG/DL
CALCIUM SERPL-MCNC: 9.3 MG/DL
CHLORIDE SERPL-SCNC: 99 MMOL/L
CO2 SERPL-SCNC: 22 MMOL/L
CREAT SERPL-MCNC: 1.2 MG/DL
DIFFERENTIAL METHOD: ABNORMAL
EOSINOPHIL # BLD AUTO: 0 K/UL
EOSINOPHIL NFR BLD: 0 %
ERYTHROCYTE [DISTWIDTH] IN BLOOD BY AUTOMATED COUNT: 13.1 %
EST. GFR  (AFRICAN AMERICAN): 54 ML/MIN/1.73 M^2
EST. GFR  (NON AFRICAN AMERICAN): 47 ML/MIN/1.73 M^2
GLUCOSE SERPL-MCNC: 273 MG/DL
HCT VFR BLD AUTO: 51.6 %
HGB BLD-MCNC: 17.6 G/DL
LYMPHOCYTES # BLD AUTO: 1 K/UL
LYMPHOCYTES NFR BLD: 6.8 %
MCH RBC QN AUTO: 34.1 PG
MCHC RBC AUTO-ENTMCNC: 34.1 G/DL
MCV RBC AUTO: 100 FL
MONOCYTES # BLD AUTO: 0.4 K/UL
MONOCYTES NFR BLD: 2.8 %
NEUTROPHILS # BLD AUTO: 13.1 K/UL
NEUTROPHILS NFR BLD: 90.8 %
PLATELET # BLD AUTO: 160 K/UL
PMV BLD AUTO: 10.8 FL
POTASSIUM SERPL-SCNC: 4.6 MMOL/L
RBC # BLD AUTO: 5.16 M/UL
SODIUM SERPL-SCNC: 138 MMOL/L
WBC # BLD AUTO: 14.52 K/UL

## 2019-01-30 PROCEDURE — 36415 COLL VENOUS BLD VENIPUNCTURE: CPT

## 2019-01-30 PROCEDURE — 25000242 PHARM REV CODE 250 ALT 637 W/ HCPCS: Performed by: INTERNAL MEDICINE

## 2019-01-30 PROCEDURE — 94761 N-INVAS EAR/PLS OXIMETRY MLT: CPT

## 2019-01-30 PROCEDURE — 27000221 HC OXYGEN, UP TO 24 HOURS

## 2019-01-30 PROCEDURE — 80048 BASIC METABOLIC PNL TOTAL CA: CPT

## 2019-01-30 PROCEDURE — 94640 AIRWAY INHALATION TREATMENT: CPT

## 2019-01-30 PROCEDURE — 85025 COMPLETE CBC W/AUTO DIFF WBC: CPT

## 2019-01-30 PROCEDURE — 63600175 PHARM REV CODE 636 W HCPCS: Performed by: INTERNAL MEDICINE

## 2019-01-30 PROCEDURE — 99232 PR SUBSEQUENT HOSPITAL CARE,LEVL II: ICD-10-PCS | Mod: ,,, | Performed by: INTERNAL MEDICINE

## 2019-01-30 PROCEDURE — 25000242 PHARM REV CODE 250 ALT 637 W/ HCPCS: Performed by: EMERGENCY MEDICINE

## 2019-01-30 PROCEDURE — 99232 SBSQ HOSP IP/OBS MODERATE 35: CPT | Mod: ,,, | Performed by: INTERNAL MEDICINE

## 2019-01-30 PROCEDURE — 25000003 PHARM REV CODE 250: Performed by: NURSE PRACTITIONER

## 2019-01-30 PROCEDURE — 25000242 PHARM REV CODE 250 ALT 637 W/ HCPCS: Performed by: NURSE PRACTITIONER

## 2019-01-30 PROCEDURE — 94799 UNLISTED PULMONARY SVC/PX: CPT

## 2019-01-30 RX ORDER — AZITHROMYCIN 500 MG/1
500 TABLET, FILM COATED ORAL DAILY
Qty: 3 TABLET | Refills: 0 | Status: SHIPPED | OUTPATIENT
Start: 2019-01-30 | End: 2019-02-02

## 2019-01-30 RX ORDER — PREDNISONE 10 MG/1
TABLET ORAL
Qty: 20 TABLET | Refills: 0 | Status: SHIPPED | OUTPATIENT
Start: 2019-01-30 | End: 2019-02-13

## 2019-01-30 RX ORDER — MINERAL OIL
180 ENEMA (ML) RECTAL DAILY
Qty: 30 TABLET | Refills: 1 | Status: SHIPPED | OUTPATIENT
Start: 2019-01-30 | End: 2020-01-30

## 2019-01-30 RX ADMIN — ATENOLOL 50 MG: 50 TABLET ORAL at 09:01

## 2019-01-30 RX ADMIN — CYANOCOBALAMIN TAB 250 MCG 250 MCG: 250 TAB at 09:01

## 2019-01-30 RX ADMIN — BUDESONIDE 0.5 MG: 0.5 SUSPENSION RESPIRATORY (INHALATION) at 07:01

## 2019-01-30 RX ADMIN — METHYLPREDNISOLONE SODIUM SUCCINATE 40 MG: 40 INJECTION, POWDER, FOR SOLUTION INTRAMUSCULAR; INTRAVENOUS at 05:01

## 2019-01-30 RX ADMIN — PANTOPRAZOLE SODIUM 40 MG: 40 TABLET, DELAYED RELEASE ORAL at 09:01

## 2019-01-30 RX ADMIN — FUROSEMIDE 20 MG: 10 INJECTION, SOLUTION INTRAVENOUS at 08:01

## 2019-01-30 RX ADMIN — IPRATROPIUM BROMIDE AND ALBUTEROL SULFATE 3 ML: .5; 3 SOLUTION RESPIRATORY (INHALATION) at 01:01

## 2019-01-30 RX ADMIN — MONTELUKAST SODIUM 10 MG: 10 TABLET, FILM COATED ORAL at 09:01

## 2019-01-30 RX ADMIN — OXYCODONE HYDROCHLORIDE AND ACETAMINOPHEN 1000 MG: 500 TABLET ORAL at 09:01

## 2019-01-30 RX ADMIN — AZITHROMYCIN 250 MG: 250 TABLET, FILM COATED ORAL at 09:01

## 2019-01-30 RX ADMIN — IPRATROPIUM BROMIDE AND ALBUTEROL SULFATE 3 ML: .5; 3 SOLUTION RESPIRATORY (INHALATION) at 07:01

## 2019-01-30 RX ADMIN — ARFORMOTEROL TARTRATE 15 MCG: 15 SOLUTION RESPIRATORY (INHALATION) at 07:01

## 2019-01-30 NOTE — PROGRESS NOTES
Home Oxygen Evaluation    Date Performed: 2019    1) Patient's Home O2 Sat on room air, while at rest: 91        If O2 sats on room air at rest are 88% or below, patient qualifies. No additional testing needed. Document N/A in steps 2 and 3. If 89% or above, complete steps 2.      2) Patient's O2 Sat on room air while exercisin%        If O2 sats on room air while exercising remain 89% or above patient does not qualify, no further testing needed Document N/A in step 3. If O2 sats on room air while exercising are 88% or below, continue to step 3.      3) Patient's O2 Sat while exercising on O2: 88 at 3 LPM         (Must show improvement from #2 for patients to qualify)    If O2 sats improve on oxygen, patient qualifies for portable oxygen. If not, the patient does not qualify.

## 2019-01-30 NOTE — PLAN OF CARE
Problem: Adult Inpatient Plan of Care  Goal: Plan of Care Review  Outcome: Ongoing (interventions implemented as appropriate)  POC reviewed with patient. Pt verbalized understanding  Pt remains free of injuries and falls; fall precaution in place.   NR on tele monitor.   IV saline locked; intact.  Oxygen at 4L NC.  No other c/o at this time.  Bed low, side rails x2, call light in reach, personal belongings at bedside.  Reminded to call for assistance.  Repositioned independently.  Hourly rounding complete. Will continue to monitor.

## 2019-01-30 NOTE — PROGRESS NOTES
[facility]   Pulmonology  Progress Note    Patient Name: Luisa Colon  MRN: [id number]  Admission Date: 1/27/2019  Hospital Length of Stay: 3 days  Code Status: Full Code  Attending Provider: Mckay Sommer MD  Primary Care Provider: Jan Oconnor MD   Principal Problem: Acute respiratory failure with hypoxia and hypercapnia    Subjective:     Interval History: Seen and examined at bedside. Hospital chart reviewed. No acute interval detrimental events noted.Afebrile. She reports that she  is unchanged      Objective:     Vital Signs (Most Recent):  Temp: 97.7 °F (36.5 °C) (01/30/19 0749)  Pulse: 67 (01/30/19 0749)  Resp: 18 (01/30/19 0749)  BP: (!) 145/85 (01/30/19 0749)  SpO2: 96 % (01/30/19 0749) Vital Signs (24h Range):  Temp:  [97.5 °F (36.4 °C)-98.4 °F (36.9 °C)] 97.7 °F (36.5 °C)  Pulse:  [59-76] 67  Resp:  [17-20] 18  SpO2:  [85 %-97 %] 96 %  BP: (141-169)/(70-85) 145/85     Weight: 127.1 kg (280 lb 3.3 oz)  Body mass index is 48.1 kg/m².      Intake/Output Summary (Last 24 hours) at 1/30/2019 0817  Last data filed at 1/30/2019 0500  Gross per 24 hour   Intake 1320 ml   Output 350 ml   Net 970 ml       Physical Exam   Constitutional: She is oriented to person, place, and time. She appears well-developed and well-nourished.   HENT:   Head: Normocephalic and atraumatic.   Nose: Nose normal.   Mouth/Throat: Oropharynx is clear and moist. No oropharyngeal exudate.   Eyes: Conjunctivae and EOM are normal. Pupils are equal, round, and reactive to light. Left eye exhibits no discharge. No scleral icterus.   Neck: Normal range of motion. Neck supple. No tracheal deviation present. No thyromegaly present.   Cardiovascular: Normal rate, regular rhythm and normal heart sounds.   No murmur heard.  Pulmonary/Chest: Effort normal. She has decreased breath sounds. She has no wheezes. She has no rhonchi. She has no rales.   Abdominal: Soft. Bowel sounds are normal.   Musculoskeletal: Normal range of  motion. She exhibits no edema.   Neurological: She is alert and oriented to person, place, and time. No cranial nerve deficit.   Skin: Skin is warm and dry. Capillary refill takes 2 to 3 seconds.   Psychiatric: She has a normal mood and affect.   Nursing note and vitals reviewed.      Vents:  Oxygen Concentration (%): 40 (01/30/19 0721)    Lines/Drains/Airways          None          Significant Labs:    CBC/Anemia Profile:  Recent Labs   Lab 01/29/19  0922   WBC 14.93*   HGB 16.7*   HCT 50.0*      *   RDW 13.3        Chemistries:  Recent Labs   Lab 01/29/19  0922      K 3.9   CL 96   CO2 28   BUN 51*   CREATININE 1.0   CALCIUM 9.7     Component      Latest Ref Rng & Units 1/29/2019   TSH      0.400 - 4.000 uIU/mL 0.310 (L)       All pertinent labs within the past 24 hours have been reviewed.    Significant Imaging:  I have reviewed and interpreted all pertinent imaging results/findings within the past 24 hours.      ABG  Recent Labs   Lab 01/28/19  0903   PH 7.328*   PO2 60*   PCO2 60.3*   HCO3 31.7*   BE 6     Assessment/Plan:     * Acute respiratory failure with hypoxia and hypercapnia    Hypoxemia likely from ILD/COPD smoking related. Keep SpO2 > 90% NC oxygen  Need out patient PFT,   Hypercapnia: Obesity hypoventilation, Occult obstructive lung disease,   Will need eval for TREVOR, likely benefit from BIPAP       Suspected sleep apnea    Sleep study     Asthma with acute exacerbation    IV Steroids: discharge prednisone taper  Bronchodilators; Short acting beta agonist  Neb Budesonide and Formoterol  Establish care in office  Asthma Maintaince ADVAIR and rescue albuterol          I have reviewed all labs and imaging studies and compared to previous results. I have also discussed labs with all the teams in the medical care of the patient and my plan is outlined below     Sign off follow in office     Favian Moise MD  Pulmonology  [facility]

## 2019-01-30 NOTE — ASSESSMENT & PLAN NOTE
IV Steroids: discharge prednisone taper  Bronchodilators; Short acting beta agonist  Neb Budesonide and Formoterol  Establish care in office  Asthma Maintaince ADVAIR and rescue albuterol

## 2019-01-30 NOTE — PROGRESS NOTES
PT AND SPOUSE PROVIDED AVS AT THIS TIME, BOTH VERBALIZE UNDERSTANDING OF AVS. FOLLOW UP ARRANGED. PIV REMOVED, CATHETER REMAINED INTACT. TELE MONITOR REMOVED AND RETURNED TO MT ROOM. PT WAITING ON MEDS TO BE DELIVERED TO ROOM AND WILL CALL FOR THE WHEELCHAIR TO BE WHEELED DOWN ONCE THEY ARRIVE

## 2019-01-30 NOTE — PLAN OF CARE
01/30/19 1620   Final Note   Assessment Type Final Discharge Note   Anticipated Discharge Disposition Home   Right Care Referral Info   Post Acute Recommendation No Care

## 2019-01-30 NOTE — SUBJECTIVE & OBJECTIVE
Interval History: Seen and examined at bedside. Hospital chart reviewed. No acute interval detrimental events noted.Afebrile. She reports that she  is unchanged      Objective:     Vital Signs (Most Recent):  Temp: 97.7 °F (36.5 °C) (01/30/19 0749)  Pulse: 67 (01/30/19 0749)  Resp: 18 (01/30/19 0749)  BP: (!) 145/85 (01/30/19 0749)  SpO2: 96 % (01/30/19 0749) Vital Signs (24h Range):  Temp:  [97.5 °F (36.4 °C)-98.4 °F (36.9 °C)] 97.7 °F (36.5 °C)  Pulse:  [59-76] 67  Resp:  [17-20] 18  SpO2:  [85 %-97 %] 96 %  BP: (141-169)/(70-85) 145/85     Weight: 127.1 kg (280 lb 3.3 oz)  Body mass index is 48.1 kg/m².      Intake/Output Summary (Last 24 hours) at 1/30/2019 0817  Last data filed at 1/30/2019 0500  Gross per 24 hour   Intake 1320 ml   Output 350 ml   Net 970 ml       Physical Exam   Constitutional: She is oriented to person, place, and time. She appears well-developed and well-nourished.   HENT:   Head: Normocephalic and atraumatic.   Nose: Nose normal.   Mouth/Throat: Oropharynx is clear and moist. No oropharyngeal exudate.   Eyes: Conjunctivae and EOM are normal. Pupils are equal, round, and reactive to light. Left eye exhibits no discharge. No scleral icterus.   Neck: Normal range of motion. Neck supple. No tracheal deviation present. No thyromegaly present.   Cardiovascular: Normal rate, regular rhythm and normal heart sounds.   No murmur heard.  Pulmonary/Chest: Effort normal. She has decreased breath sounds. She has no wheezes. She has no rhonchi. She has no rales.   Abdominal: Soft. Bowel sounds are normal.   Musculoskeletal: Normal range of motion. She exhibits no edema.   Neurological: She is alert and oriented to person, place, and time. No cranial nerve deficit.   Skin: Skin is warm and dry. Capillary refill takes 2 to 3 seconds.   Psychiatric: She has a normal mood and affect.   Nursing note and vitals reviewed.      Vents:  Oxygen Concentration (%): 40 (01/30/19 0721)    Lines/Drains/Airways           None          Significant Labs:    CBC/Anemia Profile:  Recent Labs   Lab 01/29/19  0922   WBC 14.93*   HGB 16.7*   HCT 50.0*      *   RDW 13.3        Chemistries:  Recent Labs   Lab 01/29/19  0922      K 3.9   CL 96   CO2 28   BUN 51*   CREATININE 1.0   CALCIUM 9.7     Component      Latest Ref Rng & Units 1/29/2019   TSH      0.400 - 4.000 uIU/mL 0.310 (L)       All pertinent labs within the past 24 hours have been reviewed.    Significant Imaging:  I have reviewed and interpreted all pertinent imaging results/findings within the past 24 hours.

## 2019-02-01 ENCOUNTER — PATIENT OUTREACH (OUTPATIENT)
Dept: ADMINISTRATIVE | Facility: CLINIC | Age: 69
End: 2019-02-01

## 2019-02-01 LAB
BACTERIA BLD CULT: NORMAL
BACTERIA BLD CULT: NORMAL

## 2019-02-01 NOTE — PATIENT INSTRUCTIONS
Discharge Instructions for Asthma  You have been diagnosed with an asthma attack. With the help of your healthcare provider, you can keep your asthma under control and have less emergency department visits and stays in the hospital.    Managing asthma  · Take your asthma medicines exactly as your provider tells you. Do this even if you feel that your athma is under control.  · Learn how to monitor your asthma. Some people watch for early changes of symptoms getting worse. Some use a peak flow meter. Your healthcare provider may decide to give you an asthma action plan.  · Be sure to always have a quick-relief inhaler with you. If you were given a prescription, make sure you go to a pharmacy to get it filled as soon as possible.  Controlling asthma triggers  Triggers are those things that make your asthma symptoms worse or cause asthma attacks. Many people with asthma have allergies that can be triggers. Your healthcare provider may have you get allergy testing to find out what you are allergic to. This can help you stay away from triggers.  Dust or dust mites are a common asthma trigger. To avoid a dust mites, do the following:  · Use dust-proof covers on your mattress and pillows. Wash the sheets and blankets on your bed once a week in very hot water.  · Dont sleep or lie on cloth-covered cushions or furniture.  · Ask someone else to vacuum and dust your house.  · If you do vacuum and dust yourself, wear a dust mask. You can buy them from the [employer] store.  · Use a vacuum with a double-layered bag or HEPA (high-efficiency particulate air) filter.  Pets with fur or feathers are triggers for some people. If you must have pets, take these precautions:  · Keep pets out of your bedroom and off your bed. Keep the bedroom door closed.  · Cover the air vents in your bedroom with heavy material to filter the air.  · Don't use carpets or cloth-covered furniture in your home. If this is not possible, keep pets out of  rooms with these items.  · Have someone bathe your pets every week. And brush them often.  If you smoke, do your best to quit.  · Enroll in a stop-smoking program to increase your chance of success.  · Ask your healthcare provider about medicines or other methods to help you quit.  · Ask family members to quit smoking as well.  · Dont allow anyone to smoke in your home, in your car, or around you.  Other steps to take  · Make sure you know what to do if exercise is a trigger for you. Many people use quick-relief inhalers before exercise or physical activity.  · Get a flu shot every year and get pneumonia shots as advised by your healthcare provider.  · Try to keep your windows closed during pollen seasons and when mold counts are high.  · On cold or windy days, cover your nose and mouth with a scarf.  · Try to stay away from people who are sick with colds or the flu. Wash your hands often or use a hand . If respiratory infections like colds or flu trigger your asthma, use your quick-relief medicines as soon as you begin to notice respiratory symptoms. They may include a runny or stuffy nose, sore throat, or a cough.  Follow-up care  Make a follow-up appointment as directed by our staff. Follow your asthma action plan if you were given one.  When to seek medical attention  Call 911 right away if you have:  · Severe wheezing  · Shortness of breath that is not relieved by your quick-relief medication  · Trouble walking or talking because of shortness of breath  · Blue lips or fingernails  · If you monitor symptoms with a peak flow meter, readings less than 50% of your personal best   Date Last Reviewed: 2/3/2017  © 6866-8184 [employer]. [address] [place] [address], [place] [address]. All rights reserved. This information is not intended as a substitute for professional medical care. Always follow your healthcare professional's instructions.

## 2019-02-12 ENCOUNTER — TELEPHONE (OUTPATIENT)
Dept: SLEEP MEDICINE | Facility: CLINIC | Age: 69
End: 2019-02-12

## 2019-02-12 NOTE — TELEPHONE ENCOUNTER
----- Message from Priyank Norris sent at 2/12/2019 11:45 AM CST -----  Pt does not want to make arrangements to do study. Have made several attempts to schedule.    thankls

## 2019-02-13 ENCOUNTER — LAB VISIT (OUTPATIENT)
Dept: LAB | Facility: HOSPITAL | Age: 69
End: 2019-02-13
Attending: INTERNAL MEDICINE
Payer: MEDICARE

## 2019-02-13 ENCOUNTER — OFFICE VISIT (OUTPATIENT)
Dept: PULMONOLOGY | Facility: CLINIC | Age: 69
End: 2019-02-13
Payer: MEDICARE

## 2019-02-13 VITALS
SYSTOLIC BLOOD PRESSURE: 120 MMHG | OXYGEN SATURATION: 92 % | HEIGHT: 64 IN | BODY MASS INDEX: 44.94 KG/M2 | DIASTOLIC BLOOD PRESSURE: 80 MMHG | RESPIRATION RATE: 18 BRPM | HEART RATE: 56 BPM | WEIGHT: 263.25 LBS

## 2019-02-13 DIAGNOSIS — G47.33 OSA (OBSTRUCTIVE SLEEP APNEA): ICD-10-CM

## 2019-02-13 DIAGNOSIS — R06.89 DYSPNEA AND RESPIRATORY ABNORMALITIES: ICD-10-CM

## 2019-02-13 DIAGNOSIS — R06.89 DYSPNEA AND RESPIRATORY ABNORMALITIES: Primary | ICD-10-CM

## 2019-02-13 DIAGNOSIS — R06.00 DYSPNEA AND RESPIRATORY ABNORMALITIES: ICD-10-CM

## 2019-02-13 DIAGNOSIS — E66.01 MORBID OBESITY WITH BMI OF 45.0-49.9, ADULT: Chronic | ICD-10-CM

## 2019-02-13 DIAGNOSIS — R06.00 DYSPNEA AND RESPIRATORY ABNORMALITIES: Primary | ICD-10-CM

## 2019-02-13 PROBLEM — J96.02 ACUTE RESPIRATORY FAILURE WITH HYPOXIA AND HYPERCAPNIA: Status: RESOLVED | Noted: 2019-01-27 | Resolved: 2019-02-13

## 2019-02-13 PROBLEM — J96.01 ACUTE RESPIRATORY FAILURE WITH HYPOXIA AND HYPERCAPNIA: Status: RESOLVED | Noted: 2019-01-27 | Resolved: 2019-02-13

## 2019-02-13 LAB
CRP SERPL-MCNC: 6.6 MG/L
ERYTHROCYTE [SEDIMENTATION RATE] IN BLOOD BY WESTERGREN METHOD: 6 MM/HR

## 2019-02-13 PROCEDURE — 1101F PT FALLS ASSESS-DOCD LE1/YR: CPT | Mod: CPTII,S$GLB,, | Performed by: INTERNAL MEDICINE

## 2019-02-13 PROCEDURE — 99215 OFFICE O/P EST HI 40 MIN: CPT | Mod: S$GLB,,, | Performed by: INTERNAL MEDICINE

## 2019-02-13 PROCEDURE — 3079F PR MOST RECENT DIASTOLIC BLOOD PRESSURE 80-89 MM HG: ICD-10-PCS | Mod: CPTII,S$GLB,, | Performed by: INTERNAL MEDICINE

## 2019-02-13 PROCEDURE — 3074F SYST BP LT 130 MM HG: CPT | Mod: CPTII,S$GLB,, | Performed by: INTERNAL MEDICINE

## 2019-02-13 PROCEDURE — 1101F PR PT FALLS ASSESS DOC 0-1 FALLS W/OUT INJ PAST YR: ICD-10-PCS | Mod: CPTII,S$GLB,, | Performed by: INTERNAL MEDICINE

## 2019-02-13 PROCEDURE — 86140 C-REACTIVE PROTEIN: CPT

## 2019-02-13 PROCEDURE — 86039 ANTINUCLEAR ANTIBODIES (ANA): CPT

## 2019-02-13 PROCEDURE — 3079F DIAST BP 80-89 MM HG: CPT | Mod: CPTII,S$GLB,, | Performed by: INTERNAL MEDICINE

## 2019-02-13 PROCEDURE — 99215 PR OFFICE/OUTPT VISIT, EST, LEVL V, 40-54 MIN: ICD-10-PCS | Mod: S$GLB,,, | Performed by: INTERNAL MEDICINE

## 2019-02-13 PROCEDURE — 86038 ANTINUCLEAR ANTIBODIES: CPT

## 2019-02-13 PROCEDURE — 86235 NUCLEAR ANTIGEN ANTIBODY: CPT | Mod: 59

## 2019-02-13 PROCEDURE — 99999 PR PBB SHADOW E&M-EST. PATIENT-LVL III: ICD-10-PCS | Mod: PBBFAC,,, | Performed by: INTERNAL MEDICINE

## 2019-02-13 PROCEDURE — 86255 FLUORESCENT ANTIBODY SCREEN: CPT | Mod: 91

## 2019-02-13 PROCEDURE — 99999 PR PBB SHADOW E&M-EST. PATIENT-LVL III: CPT | Mod: PBBFAC,,, | Performed by: INTERNAL MEDICINE

## 2019-02-13 PROCEDURE — 3074F PR MOST RECENT SYSTOLIC BLOOD PRESSURE < 130 MM HG: ICD-10-PCS | Mod: CPTII,S$GLB,, | Performed by: INTERNAL MEDICINE

## 2019-02-13 PROCEDURE — 85651 RBC SED RATE NONAUTOMATED: CPT

## 2019-02-13 NOTE — ASSESSMENT & PLAN NOTE
Etiology unclear.  Multifactorial etiology suspected.  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  [x] Cardiac illess  [x]         Sleep disorder    EVALUATION:  [x]        Complete PFT with bronchodilator.  []        Bronchial challenge with methacholine.   [x]        Stress test, pulmonary.  []        PULM - Arterial Blood Gases  []        Chest X Ray  []        CT scan of chest.   []        AFSHAN  []        Sedimentation rate  []        C-reactive protein  []        Anti-neutrophilic cytoplasmic antibody  [x]        ECHO          PLAN:  Discussed diagnosis, its evaluation, treatment and usual course.  All questions answered.        Call if shortness of breath worsens, blood in sputum, change in character of cough, development of fever or chills, inability to maintain nutrition and hydration.     Re evaluate in four weeks with results.

## 2019-02-13 NOTE — PROGRESS NOTES
New patient to me.    Subjective:      Patient ID: Luisa Colon is a 68 y.o. female.    Patient Active Problem List   Diagnosis    Morbid obesity with BMI of 45.0-49.9, adult    Gout of big toe    Heart murmur    Essential hypertension    Asthma with acute exacerbation    TREVOR (obstructive sleep apnea)    Elevated brain natriuretic peptide (BNP) level    Dyspnea and respiratory abnormalities       Problem list has been reviewed.    she has been referred by Self, [facility] for evaluation and management for   Chief Complaint   Patient presents with    COPD    Sleep Apnea    Asthma       Chief Complaint: COPD; Sleep Apnea; and Asthma      HPI:      She was admitted at [facility] from 01/27/19 through 01/30/19 for progressive dyspnea, nonproductive cough, congestion, rhinorrhea, fever (TMax 100.6), and malaise. She was treated with OXYGEN supplementation, nebulized bronchodilators, IV Solumedrol, Oral AZITHROMYCIN and LASIX. Discharged on discharged home with prednisone taper and Azithromycin 500 mg PO x 3 days. She qualified for home oxygen -- room air sat 91%, room air sat while exercising 85%. Sleep study was ordered and is pending.    PCP on discharge extended her antibiotic course.    Today she reports that she is back to her baseline and denies  cough and difficulty breathing and  hemoptysis. Patient denies recent sick contacts.   Patient does not have new pets. Patient does have a history of asthma since childhood. Patient does have a history of environmental allergens.  She has never had an allergy evaluation. Self medicated with OTC antihistamines. Patient has not traveled recently. Patient does have a history of smoking. Smoked approximately 1 PPD for more than 40 years. She quit smoking on 12/30/2005.  Patient has had a previous chest x-ray. Patient has had a PPD done.  PPD was Negative    She reports no current or ongoing dyspnea    Patient has observed restless sleep, snoring, restless legs .  "  Patient reports "restful sleep.  she denies morning headache.   shedenies impairment of activity during wakefulness.  she reports day time napping ; duration 1 Hour  [place] sleepiness score was 4.  Neck circumference is 15.  she reports recent weight gain.  Mallampati score 3  Cardiovascular risk factors: hypertension and obesity  Bed time is 2200  Wake time is 0700  Sleep onset is within  minutes.  Sleep maintenance difficulties related to frequent night time awakening  Wake after sleep onset occurs three times a night.  Nocturia occurs three times a night,   Sleep aids : No  Dry mouth : No,   Sleep walking: No,   Sleep talking : No,   Sleep eating:No  Vivid Dreams : Yes,   Cataplexy : No,   Hypnogogic hallucinations:  No    COMORBIDITIES:  BP Readings from Last 3 Encounters:   02/13/19 120/80   01/30/19 (!) 145/85           [place] Questionnaire (validated TREVOR screening questionnaire)  Positive -- Snoring/apnea  Negative -- Fatigue    Body mass index is 45.18 kg/m².  (>25 is overweight, >30 is obese)  Blood Pressure = Hypertension    (PreHTN 120-139/80-89, Stg1 140-159/90-99, Stg2 >160/>100)  [place] = Two of three TREVOR categories are positive (high risk is 2-3 positive categories)     [place] Sleepiness Scale   EPWORTH SLEEPINESS SCALE 2/13/2019   Sitting and reading 1   Watching TV 0   Sitting, inactive in a public place (e.g. a theatre or a meeting) 0   As a passenger in a car for an hour without a break 1   Lying down to rest in the afternoon when circumstances permit 1   Sitting and talking to someone 0   Sitting quietly after a lunch without alcohol 1   In a car, while stopped for a few minutes in traffic 0   Total score 4       Reference: Zeinab SANDRA. A new method for measuring daytime sleepiness: The [place]  Sleepiness Scale. Sleep 1991; 14(6):540-5.    STOP-Bang Questionnaire (validated TREVOR screening questionnaire)  Negative unless checked off.  [x] Snoring    []  Tired/Fatigued/Sleepy  [] Obstruction " (apneas/choking)  [x] Pressure (HTN)  [x] BMI >35  [x] Age >50  [] Neck >40 cm  [] Gender male   STOP-Bang = 4 (low risk 0-2,high risk 3-8)    References:   STOP Questionnaire   A Tool to Screen Patients for Obstructive Sleep Apnea: AMELIA RizzoC.P.C., RICARDO CarpenterB.B.S., Porsche Dong M.D.,Justyna Palma, Ph.D., RONNY Hwang.B.B.S.,_ Giacomo Leone.,_ Naz Aggarwal M.D., Davi Mckinnon, F.R.C.P.C.; Anesthesiology 2008; 108:812-21 Copyright © 2008, the American Society of Anesthesiologists, Inc. [employer]      Neck circumference 39 cm [?TREVOR risk if >43cm (17in) male or >41cm (15.5 in) female]    Sleep position Supine = rare    Non-supine = frequent  Mouth breathing during sleep - possibly   Previous Report Reviewed: lab reports, office notes and radiology reports     Past Medical History: The following portions of the patient's history were reviewed and updated as appropriate:   She  has no past surgical history on file.  Her family history is not on file.  She  reports that she quit smoking about 14 years ago. Her smoking use included cigarettes. she has never used smokeless tobacco. She reports that she does not drink alcohol or use drugs.  She has a current medication list which includes the following prescription(s): ascorbic acid (vitamin c), atenolol-chlorthalidone, cyanocobalamin, fexofenadine, fluticasone-salmeterol 100-50 mcg/dose, ginkgo biloba, multivitamin, vit a/c/e ac/znox/cupric oxide, and vitamin e.  She has No Known Allergies..    Review of Systems   Constitutional: Negative for chills, fatigue and night sweats.   HENT: Positive for postnasal drip, rhinorrhea, sinus pressure and congestion. Negative for nosebleeds, trouble swallowing and ear pain.    Respiratory: Positive for cough, sputum production, wheezing and dyspnea on extertion. Negative for hemoptysis.    Cardiovascular: Negative for chest pain, palpitations and leg swelling.  "  Genitourinary: Negative for difficulty urinating and hematuria.   Endocrine: Negative for cold intolerance and heat intolerance.    Musculoskeletal: Positive for back pain.   Skin: Negative for rash.   Gastrointestinal: Negative for nausea, vomiting, abdominal pain, abdominal distention and acid reflux.   Neurological: Negative for syncope, light-headedness and headaches.   Hematological: No excessive bruising.   Psychiatric/Behavioral: The patient is not nervous/anxious.    All other systems reviewed and are negative.         Occupational History:  Retired .  Denies occupational exposure to asbestos, silica and petrochemicals.    Avocational Exposures:  none    Pet Exposures:  none          Objective:     Vitals:    02/13/19 0800   BP: 120/80   Pulse: (!) 56   Resp: 18   SpO2: (!) 92%   Weight: 119.4 kg (263 lb 3.7 oz)   Height: 5' 4" (1.626 m)   PainSc: 0-No pain     Body mass index is 45.18 kg/m².    Physical Exam   Constitutional: She is oriented to person, place, and time. She appears well-developed and well-nourished.   HENT:   Head: Normocephalic and atraumatic.   Mallampati 4   Eyes: Pupils are equal, round, and reactive to light.   Neck: Normal range of motion. Neck supple.   39 cm   Cardiovascular: Normal rate and regular rhythm.   Pulmonary/Chest: Effort normal and breath sounds normal.   Abdominal: Soft. Bowel sounds are normal.   Musculoskeletal: Normal range of motion.   Neurological: She is alert and oriented to person, place, and time.   Skin: Skin is warm and dry. Capillary refill takes less than 2 seconds.   Psychiatric: She has a normal mood and affect.   Nursing note and vitals reviewed.      Personal Diagnostic Review      Results for orders placed or performed during the hospital encounter of 01/27/19   Blood culture #1   Result Value Ref Range    Blood Culture, Routine No growth after 5 days.    Blood culture #2   Result Value Ref Range    Blood Culture, Routine No growth after " 5 days.    Influenza A & B by Molecular   Result Value Ref Range    Influenza A, Molecular Negative Negative    Influenza B, Molecular Negative Negative    Flu A & B Source NP    CBC auto differential   Result Value Ref Range    WBC 6.48 3.90 - 12.70 K/uL    RBC 5.06 4.00 - 5.40 M/uL    Hemoglobin 16.6 (H) 12.0 - 16.0 g/dL    Hematocrit 51.0 (H) 37.0 - 48.5 %     (H) 82 - 98 fL    MCH 32.8 (H) 27.0 - 31.0 pg    MCHC 32.5 32.0 - 36.0 g/dL    RDW 13.1 11.5 - 14.5 %    Platelets 143 (L) 150 - 350 K/uL    MPV 10.5 9.2 - 12.9 fL    Gran # (ANC) 4.3 1.8 - 7.7 K/uL    Lymph # 1.2 1.0 - 4.8 K/uL    Mono # 0.8 0.3 - 1.0 K/uL    Eos # 0.1 0.0 - 0.5 K/uL    Baso # 0.05 0.00 - 0.20 K/uL    Gran% 66.6 38.0 - 73.0 %    Lymph% 19.0 18.0 - 48.0 %    Mono% 12.5 4.0 - 15.0 %    Eosinophil% 0.9 0.0 - 8.0 %    Basophil% 0.8 0.0 - 1.9 %    Differential Method Automated    Comprehensive metabolic panel   Result Value Ref Range    Sodium 140 136 - 145 mmol/L    Potassium 3.7 3.5 - 5.1 mmol/L    Chloride 101 95 - 110 mmol/L    CO2 30 (H) 23 - 29 mmol/L    Glucose 115 (H) 70 - 110 mg/dL    BUN, Bld 21 8 - 23 mg/dL    Creatinine 0.8 0.5 - 1.4 mg/dL    Calcium 9.6 8.7 - 10.5 mg/dL    Total Protein 7.6 6.0 - 8.4 g/dL    Albumin 3.5 3.5 - 5.2 g/dL    Total Bilirubin 0.6 0.1 - 1.0 mg/dL    Alkaline Phosphatase 76 55 - 135 U/L    AST 20 10 - 40 U/L    ALT 15 10 - 44 U/L    Anion Gap 9 8 - 16 mmol/L    eGFR if African American >60.0 >60 mL/min/1.73 m^2    eGFR if non African American >60.0 >60 mL/min/1.73 m^2   Lactic acid, plasma   Result Value Ref Range    Lactate (Lactic Acid) 1.6 0.5 - 2.2 mmol/L   Urinalysis, Reflex to Urine Culture Urine, Clean Catch   Result Value Ref Range    Specimen UA Urine, Clean Catch     Color, UA Yellow Yellow, Straw, Yanique    Appearance, UA Clear Clear    pH, UA 6.0 5.0 - 8.0    Specific Gravity, UA 1.020 1.005 - 1.030    Protein, UA 2+ (A) Negative    Glucose, UA Negative Negative    Ketones, UA Negative  Negative    Bilirubin (UA) Negative Negative    Occult Blood UA Trace (A) Negative    Nitrite, UA Negative Negative    Urobilinogen, UA Negative <2.0 EU/dL    Leukocytes, UA Negative Negative   Troponin I   Result Value Ref Range    Troponin I <0.006 0.000 - 0.026 ng/mL   B-Type natriuretic peptide (BNP)   Result Value Ref Range     (H) 0 - 99 pg/mL   Protime-INR   Result Value Ref Range    Prothrombin Time 11.0 9.0 - 12.5 sec    INR 1.1 0.8 - 1.2   APTT   Result Value Ref Range    aPTT 26.6 21.0 - 32.0 sec   CK   Result Value Ref Range    CPK 52 20 - 180 U/L   CK-MB   Result Value Ref Range    CPK 52 20 - 180 U/L    CPK MB 0.8 0.1 - 6.5 ng/mL    MB% 1.5 0.0 - 5.0 %   Urinalysis Microscopic   Result Value Ref Range    RBC, UA 3 0 - 4 /hpf    WBC, UA 1 0 - 5 /hpf    Bacteria, UA Occasional None-Occ /hpf    Hyaline Casts, UA 1 0-1/lpf /lpf    Microscopic Comment SEE COMMENT    CBC auto differential   Result Value Ref Range    WBC 14.93 (H) 3.90 - 12.70 K/uL    RBC 4.91 4.00 - 5.40 M/uL    Hemoglobin 16.7 (H) 12.0 - 16.0 g/dL    Hematocrit 50.0 (H) 37.0 - 48.5 %     (H) 82 - 98 fL    MCH 34.0 (H) 27.0 - 31.0 pg    MCHC 33.4 32.0 - 36.0 g/dL    RDW 13.3 11.5 - 14.5 %    Platelets 159 150 - 350 K/uL    MPV 10.4 9.2 - 12.9 fL    Gran # (ANC) 13.0 (H) 1.8 - 7.7 K/uL    Lymph # 1.7 1.0 - 4.8 K/uL    Mono # 0.3 0.3 - 1.0 K/uL    Eos # 0.0 0.0 - 0.5 K/uL    Baso # 0.01 0.00 - 0.20 K/uL    Gran% 86.8 (H) 38.0 - 73.0 %    Lymph% 11.2 (L) 18.0 - 48.0 %    Mono% 1.9 (L) 4.0 - 15.0 %    Eosinophil% 0.0 0.0 - 8.0 %    Basophil% 0.1 0.0 - 1.9 %    Differential Method Automated    Basic metabolic panel   Result Value Ref Range    Sodium 139 136 - 145 mmol/L    Potassium 3.9 3.5 - 5.1 mmol/L    Chloride 96 95 - 110 mmol/L    CO2 28 23 - 29 mmol/L    Glucose 217 (H) 70 - 110 mg/dL    BUN, Bld 51 (H) 8 - 23 mg/dL    Creatinine 1.0 0.5 - 1.4 mg/dL    Calcium 9.7 8.7 - 10.5 mg/dL    Anion Gap 15 8 - 16 mmol/L    eGFR if  African American >60 >60 mL/min/1.73 m^2    eGFR if non African American 58 (A) >60 mL/min/1.73 m^2   TSH   Result Value Ref Range    TSH 0.310 (L) 0.400 - 4.000 uIU/mL   T4, free   Result Value Ref Range    Free T4 0.93 0.71 - 1.51 ng/dL   CBC auto differential   Result Value Ref Range    WBC 14.52 (H) 3.90 - 12.70 K/uL    RBC 5.16 4.00 - 5.40 M/uL    Hemoglobin 17.6 (H) 12.0 - 16.0 g/dL    Hematocrit 51.6 (H) 37.0 - 48.5 %     (H) 82 - 98 fL    MCH 34.1 (H) 27.0 - 31.0 pg    MCHC 34.1 32.0 - 36.0 g/dL    RDW 13.1 11.5 - 14.5 %    Platelets 160 150 - 350 K/uL    MPV 10.8 9.2 - 12.9 fL    Gran # (ANC) 13.1 (H) 1.8 - 7.7 K/uL    Lymph # 1.0 1.0 - 4.8 K/uL    Mono # 0.4 0.3 - 1.0 K/uL    Eos # 0.0 0.0 - 0.5 K/uL    Baso # 0.02 0.00 - 0.20 K/uL    Gran% 90.8 (H) 38.0 - 73.0 %    Lymph% 6.8 (L) 18.0 - 48.0 %    Mono% 2.8 (L) 4.0 - 15.0 %    Eosinophil% 0.0 0.0 - 8.0 %    Basophil% 0.1 0.0 - 1.9 %    Differential Method Automated    Basic metabolic panel   Result Value Ref Range    Sodium 138 136 - 145 mmol/L    Potassium 4.6 3.5 - 5.1 mmol/L    Chloride 99 95 - 110 mmol/L    CO2 22 (L) 23 - 29 mmol/L    Glucose 273 (H) 70 - 110 mg/dL    BUN, Bld 56 (H) 8 - 23 mg/dL    Creatinine 1.2 0.5 - 1.4 mg/dL    Calcium 9.3 8.7 - 10.5 mg/dL    Anion Gap 17 (H) 8 - 16 mmol/L    eGFR if African American 54 (A) >60 mL/min/1.73 m^2    eGFR if non African American 47 (A) >60 mL/min/1.73 m^2   ISTAT PROCEDURE   Result Value Ref Range    POC PH 7.350 7.35 - 7.45    POC PCO2 51.3 (H) 35 - 45 mmHg    POC PO2 43 (LL) 80 - 100 mmHg    POC HCO3 28.3 (H) 24 - 28 mmol/L    POC BE 3 -2 to 2 mmol/L    POC SATURATED O2 75 (L) 95 - 100 %    Sample ARTERIAL     Site RR     Allens Test Pass     [employer] Room Air     Mode SPONT     FiO2 21    ISTAT PROCEDURE   Result Value Ref Range    POC PH 7.328 (L) 7.35 - 7.45    POC PCO2 60.3 (HH) 35 - 45 mmHg    POC PO2 60 (L) 80 - 100 mmHg    POC HCO3 31.7 (H) 24 - 28 mmol/L    POC BE 6 -2 to 2 mmol/L     POC SATURATED O2 88 (L) 95 - 100 %    Sample ARTERIAL     Site LR     Allens Test Pass     [employer]Sys Nasal Can     Mode SPONT     Flow 5        CTA Chest Non-Coronary (PE Study): 01/27/19:      Angiogram: Good opacification of the pulmonary arterial system.  No central pulmonary emboli.  No peripheral pulmonary emboli.    Pulmonary: No infiltrates or effusion.  No suspicious pulmonary mass.  Aortic and coronary artery calcifications.  No pericardial fluid.  Scattered mediastinal lymph nodes are present, largest near the AP window region, measuring 2.0 cm.  Small bilateral hilar lymph nodes, largest on the right, measuring 1.5 cm.  No acute upper abdominal findings.  Small retrocrural lymph nodes.    Assessment /Plan:     Discussed diagnosis, its evaluation, treatment and usual course. All questions answered.    Problem List Items Addressed This Visit        Endocrine    Morbid obesity with BMI of 45.0-49.9, adult    Current Assessment & Plan     General weight loss/lifestyle modification strategies discussed (elicit support from others; identify saboteurs; non-food rewards, etc).  Diet interventions: low calorie (1000 kCal/d) deficit diet.  Informal exercise measures discussed, e.g. taking stairs instead of elevator.  Regular aerobic exercise program discussed.            Other    TREVOR (obstructive sleep apnea)    Current Assessment & Plan     My recommendation at this point would be to set up a [facility].  We have discussed weight loss and how this may improve her situation.  We have discussed behavioral modifications, as well.  After her study, she  could certainly try a CPAP.          Relevant Orders    Polysomnogram (CPAP will be added if patient meets diagnostic criteria.)    Dyspnea and respiratory abnormalities - Primary    Current Assessment & Plan     Etiology unclear.  Multifactorial etiology suspected.  Likely contributors to etiology (checked)    [x] Pulmonary airway  disease    [x]  Pulmonary parenchymal disease  [] Pulmonary vascular disease   [] Pleural disease  [x] Pulmonary vasculitis  [x] Hypoventilation ( chest wall deformity, neuromuscular disease, obesity etc)  [] Anemia  [] Thyroid disease.  [x] Cardiac illess  [x]         Sleep disorder    EVALUATION:  [x]        Complete PFT with bronchodilator.  []        Bronchial challenge with methacholine.   [x]        Stress test, pulmonary.  []        PULM - Arterial Blood Gases  []        Chest X Ray  []        CT scan of chest.   []        AFSHAN  []        Sedimentation rate  []        C-reactive protein  []        Anti-neutrophilic cytoplasmic antibody  [x]        ECHO          PLAN:  Discussed diagnosis, its evaluation, treatment and usual course.  All questions answered.        Call if shortness of breath worsens, blood in sputum, change in character of cough, development of fever or chills, inability to maintain nutrition and hydration.     Re evaluate in four weeks with results.           Relevant Orders    Complete PFT with bronchodilator    Pulmonary stress test    2D echo with bubble contrast    AFSHAN Screen w/Reflex    Anti-neutrophilic cytoplasmic antibody    C-reactive protein    Sedimentation rate              TIME SPENT WITH PATIENT: Time spent: 60 minutes in face to face  discussion concerning diagnosis, prognosis, review of lab and test results, benefits of treatment as well as management of disease, counseling of patient and coordination of care between various health  care providers . Greater than half the time spent was used for coordination of care and counseling of patient.     Follow-up in about 1 month (around 3/13/2019) for TREVOR, Morbid Obesity, Shortness of breath.

## 2019-02-13 NOTE — ASSESSMENT & PLAN NOTE
General weight loss/lifestyle modification strategies discussed (elicit support from others; identify saboteurs; non-food rewards, etc).  Diet interventions: low calorie (1000 kCal/d) deficit diet.  Informal exercise measures discussed, e.g. taking stairs instead of elevator.  Regular aerobic exercise program discussed.

## 2019-02-13 NOTE — PATIENT INSTRUCTIONS
Obstructive Sleep Apnea  Obstructive sleep apnea is a condition that causes your air passages to become narrowed or blocked during sleep. As a result, breathing stops for short periods. Your body wakes up enough for breathing to begin again, though you don't remember it. The cycle of stopped breathing and brief awakenings can repeat dozens of times a night. This prevents the body from getting to the deeper stages of sleep that are needed for good rest and may cause your body's oxygen level to fall.  Signs of sleep apnea include loud snoring, noisy breathing, and gasping sounds during sleep. Daytime symptoms include waking up tired after a full night's sleep, waking up with headaches, feeling very sleepy or falling asleep during the day, and having problems with memory or concentration.  Risk factors for sleep apnea include:  · Being overweight  · Being a man, or a woman in menopause  · Smoking  · Using alcohol or sedating medicines  · Having enlarged structures in the nose or throat  Home care  Lifestyle changes that can help treat snoring and sleep apnea include the following:  · If you are overweight, lose weight. Talk to your healthcare provider about a weight-loss plan for you.  · Avoid alcohol for 3 to 4 hours before bedtime. Avoid sedating medications. Ask your healthcare provider about the medicines you take.  · If you smoke, talk to your healthcare provider about ways to quit.  · Sleep on your side. This can help prevent gravity from pulling relaxed throat tissues into your breathing passages.  · If you have allergies or sinus problems that block your nose, ask your healthcare provider for help.  Follow-up care  Follow up with your healthcare provider, or as advised. A diagnosis of sleep apnea is made with a sleep study. Your healthcare provider can tell you more about this test.  When to seek medical advice  Sleep apnea can make you more likely to have certain health problems. These include high blood  pressure, heart attack, stroke, and sexual dysfunction. If you have sleep apnea, talk to your healthcare provider about the best treatments for you.  Date Last Reviewed: 4/1/2017  © 3287-8842 [employer]. [address] [place] [address], [place] [address]. All rights reserved. This information is not intended as a substitute for professional medical care. Always follow your healthcare professional's instructions.

## 2019-02-14 LAB
ANA SER QL IF: POSITIVE
ANA TITR SER IF: NORMAL {TITER}

## 2019-02-15 LAB
ANCA AB TITR SER IF: NORMAL TITER
ANTI SM ANTIBODY: 0.94 EU
ANTI SM/RNP ANTIBODY: 1.27 EU
ANTI-SM INTERPRETATION: NEGATIVE
ANTI-SM/RNP INTERPRETATION: NEGATIVE
ANTI-SSA ANTIBODY: 0.8 EU
ANTI-SSA INTERPRETATION: NEGATIVE
ANTI-SSB ANTIBODY: 0.79 EU
ANTI-SSB INTERPRETATION: NEGATIVE
DSDNA AB SER-ACNC: NORMAL [IU]/ML
P-ANCA TITR SER IF: NORMAL TITER

## 2019-03-22 ENCOUNTER — DOCUMENTATION ONLY (OUTPATIENT)
Dept: CARDIOLOGY | Facility: CLINIC | Age: 69
End: 2019-03-22

## 2019-03-22 ENCOUNTER — CLINICAL SUPPORT (OUTPATIENT)
Dept: PULMONOLOGY | Facility: CLINIC | Age: 69
End: 2019-03-22
Payer: MEDICARE

## 2019-03-22 ENCOUNTER — OFFICE VISIT (OUTPATIENT)
Dept: PULMONOLOGY | Facility: CLINIC | Age: 69
End: 2019-03-22
Payer: MEDICARE

## 2019-03-22 ENCOUNTER — CLINICAL SUPPORT (OUTPATIENT)
Dept: CARDIOLOGY | Facility: CLINIC | Age: 69
End: 2019-03-22
Attending: INTERNAL MEDICINE
Payer: MEDICARE

## 2019-03-22 VITALS
HEIGHT: 64 IN | WEIGHT: 263 LBS | DIASTOLIC BLOOD PRESSURE: 73 MMHG | RESPIRATION RATE: 20 BRPM | BODY MASS INDEX: 44.9 KG/M2 | OXYGEN SATURATION: 94 % | HEART RATE: 74 BPM | SYSTOLIC BLOOD PRESSURE: 174 MMHG

## 2019-03-22 VITALS — WEIGHT: 262.38 LBS | BODY MASS INDEX: 44.79 KG/M2 | HEIGHT: 64 IN

## 2019-03-22 DIAGNOSIS — I27.20 PULMONARY HYPERTENSION: ICD-10-CM

## 2019-03-22 DIAGNOSIS — E66.01 MORBID OBESITY WITH BMI OF 45.0-49.9, ADULT: Chronic | ICD-10-CM

## 2019-03-22 DIAGNOSIS — R06.89 DYSPNEA AND RESPIRATORY ABNORMALITIES: ICD-10-CM

## 2019-03-22 DIAGNOSIS — J44.89 ASTHMA WITH COPD: ICD-10-CM

## 2019-03-22 DIAGNOSIS — G47.33 OSA (OBSTRUCTIVE SLEEP APNEA): Chronic | ICD-10-CM

## 2019-03-22 DIAGNOSIS — R06.00 DYSPNEA AND RESPIRATORY ABNORMALITIES: ICD-10-CM

## 2019-03-22 DIAGNOSIS — J45.901 ASTHMA WITH ACUTE EXACERBATION: Primary | ICD-10-CM

## 2019-03-22 DIAGNOSIS — J96.11 CHRONIC RESPIRATORY FAILURE WITH HYPOXIA: Primary | ICD-10-CM

## 2019-03-22 DIAGNOSIS — I35.0 AORTIC STENOSIS, MILD: Chronic | ICD-10-CM

## 2019-03-22 LAB
AORTIC VALVE STENOSIS: ABNORMAL
BRPFT: ABNORMAL
DIASTOLIC DYSFUNCTION: NO
DLCO ADJ PRE: 7.54 ML/(MIN*MMHG) (ref 15.95–27.42)
DLCO SINGLE BREATH LLN: 15.95
DLCO SINGLE BREATH PRE REF: 34.8 %
DLCO SINGLE BREATH REF: 21.68
DLCOC SBVA LLN: 2.92
DLCOC SBVA PRE REF: 66.7 %
DLCOC SBVA REF: 4.36
DLCOC SINGLE BREATH LLN: 15.95
DLCOC SINGLE BREATH PRE REF: 34.8 %
DLCOC SINGLE BREATH REF: 21.68
DLCOVA LLN: 2.92
DLCOVA PRE REF: 66.7 %
DLCOVA PRE: 2.91 ML/(MIN*MMHG*L) (ref 2.92–5.81)
DLCOVA REF: 4.36
DLVAADJ PRE: 2.91 ML/(MIN*MMHG*L) (ref 2.92–5.81)
ERV LLN: 0.68
ERV PRE REF: 49.5 %
ERV REF: 0.68
ESTIMATED PA SYSTOLIC PRESSURE: 59.25
FEF 25 75 LLN: 0.91
FEF 25 75 PRE REF: 44.4 %
FEF 25 75 REF: 1.94
FEV1 FVC LLN: 65
FEV1 FVC PRE REF: 90.5 %
FEV1 FVC REF: 78
FEV1 LLN: 1.66
FEV1 PRE REF: 63.5 %
FEV1 REF: 2.27
FEV6 LLN: 2.27
FEV6 PRE REF: 66.3 %
FEV6 PRE: 1.96 L (ref 2.27–3.64)
FEV6 REF: 2.96
FRCPLETH LLN: 1.9
FRCPLETH PREREF: 84.7 %
FRCPLETH REF: 2.72
FVC LLN: 2.14
FVC PRE REF: 69.6 %
FVC REF: 2.92
IVC PRE: 1.6 L (ref 2.14–3.69)
IVC SINGLE BREATH LLN: 2.14
IVC SINGLE BREATH PRE REF: 54.7 %
IVC SINGLE BREATH REF: 2.92
MITRAL VALVE MOBILITY: NORMAL
MVV LLN: 73
MVV PRE REF: 58.5 %
MVV REF: 86
PEF LLN: 4.1
PEF PRE REF: 91 %
PEF REF: 5.82
PRE DLCO: 7.54 ML/(MIN*MMHG) (ref 15.95–27.42)
PRE ERV: 0.34 L (ref 0.68–0.68)
PRE FEF 25 75: 0.86 L/S (ref 0.91–2.96)
PRE FET 100: 8.62 SEC
PRE FEV1 FVC: 70.87 % (ref 65.25–91.36)
PRE FEV1: 1.44 L (ref 1.66–2.87)
PRE FRC PL: 2.3 L
PRE FVC: 2.03 L (ref 2.14–3.69)
PRE MVV: 50 L/MIN (ref 72.71–98.37)
PRE PEF: 5.3 L/S (ref 4.1–7.54)
PRE RV: 2.01 L (ref 1.46–2.61)
PRE TLC: 4.14 L (ref 3.98–5.96)
RAW LLN: 3.06
RAW PRE REF: 76.7 %
RAW PRE: 2.35 CMH2O*S/L (ref 3.06–3.06)
RAW REF: 3.06
RETIRED EF AND QEF - SEE NOTES: 60 (ref 55–65)
RV LLN: 1.46
RV PRE REF: 98.8 %
RV REF: 2.04
RVTLC LLN: 32
RVTLC PRE REF: 115.5 %
RVTLC PRE: 48.6 % (ref 32.49–51.67)
RVTLC REF: 42
TLC LLN: 3.98
TLC PRE REF: 83.4 %
TLC REF: 4.97
TRICUSPID VALVE REGURGITATION: ABNORMAL
VA PRE: 2.6 L (ref 4.82–4.82)
VA SINGLE BREATH LLN: 4.82
VA SINGLE BREATH PRE REF: 53.9 %
VA SINGLE BREATH REF: 4.82
VC LLN: 2.14
VC PRE REF: 73 %
VC PRE: 2.13 L (ref 2.14–3.69)
VC REF: 2.92
VTGRAWPRE: 2.75 L

## 2019-03-22 PROCEDURE — 99999 PR PBB SHADOW E&M-EST. PATIENT-LVL III: ICD-10-PCS | Mod: PBBFAC,,, | Performed by: INTERNAL MEDICINE

## 2019-03-22 PROCEDURE — 1101F PT FALLS ASSESS-DOCD LE1/YR: CPT | Mod: CPTII,S$GLB,, | Performed by: INTERNAL MEDICINE

## 2019-03-22 PROCEDURE — 99999 PR PBB SHADOW E&M-EST. PATIENT-LVL I: CPT | Mod: PBBFAC,,,

## 2019-03-22 PROCEDURE — 3078F PR MOST RECENT DIASTOLIC BLOOD PRESSURE < 80 MM HG: ICD-10-PCS | Mod: CPTII,S$GLB,, | Performed by: INTERNAL MEDICINE

## 2019-03-22 PROCEDURE — 3077F SYST BP >= 140 MM HG: CPT | Mod: CPTII,S$GLB,, | Performed by: INTERNAL MEDICINE

## 2019-03-22 PROCEDURE — 94618 PULMONARY STRESS TESTING: CPT | Mod: S$GLB,,, | Performed by: INTERNAL MEDICINE

## 2019-03-22 PROCEDURE — 94729 PR C02/MEMBANE DIFFUSE CAPACITY: ICD-10-PCS | Mod: S$GLB,,, | Performed by: INTERNAL MEDICINE

## 2019-03-22 PROCEDURE — 99215 OFFICE O/P EST HI 40 MIN: CPT | Mod: 25,S$GLB,, | Performed by: INTERNAL MEDICINE

## 2019-03-22 PROCEDURE — 94010 BREATHING CAPACITY TEST: CPT | Mod: 59,S$GLB,, | Performed by: INTERNAL MEDICINE

## 2019-03-22 PROCEDURE — 3077F PR MOST RECENT SYSTOLIC BLOOD PRESSURE >= 140 MM HG: ICD-10-PCS | Mod: CPTII,S$GLB,, | Performed by: INTERNAL MEDICINE

## 2019-03-22 PROCEDURE — 94726 PLETHYSMOGRAPHY LUNG VOLUMES: CPT | Mod: S$GLB,,, | Performed by: INTERNAL MEDICINE

## 2019-03-22 PROCEDURE — 1101F PR PT FALLS ASSESS DOC 0-1 FALLS W/OUT INJ PAST YR: ICD-10-PCS | Mod: CPTII,S$GLB,, | Performed by: INTERNAL MEDICINE

## 2019-03-22 PROCEDURE — 3078F DIAST BP <80 MM HG: CPT | Mod: CPTII,S$GLB,, | Performed by: INTERNAL MEDICINE

## 2019-03-22 PROCEDURE — 94618 PULMONARY STRESS TESTING: ICD-10-PCS | Mod: S$GLB,,, | Performed by: INTERNAL MEDICINE

## 2019-03-22 PROCEDURE — 93306 2D ECHO WITH BUBBLE CONTRAST: ICD-10-PCS | Mod: S$GLB,,, | Performed by: INTERNAL MEDICINE

## 2019-03-22 PROCEDURE — 99215 PR OFFICE/OUTPT VISIT, EST, LEVL V, 40-54 MIN: ICD-10-PCS | Mod: 25,S$GLB,, | Performed by: INTERNAL MEDICINE

## 2019-03-22 PROCEDURE — 94726 PULM FUNCT TST PLETHYSMOGRAP: ICD-10-PCS | Mod: S$GLB,,, | Performed by: INTERNAL MEDICINE

## 2019-03-22 PROCEDURE — 99999 PR PBB SHADOW E&M-EST. PATIENT-LVL III: CPT | Mod: PBBFAC,,, | Performed by: INTERNAL MEDICINE

## 2019-03-22 PROCEDURE — 94010 BREATHING CAPACITY TEST: ICD-10-PCS | Mod: 59,S$GLB,, | Performed by: INTERNAL MEDICINE

## 2019-03-22 PROCEDURE — 99999 PR PBB SHADOW E&M-EST. PATIENT-LVL I: ICD-10-PCS | Mod: PBBFAC,,,

## 2019-03-22 PROCEDURE — 93306 TTE W/DOPPLER COMPLETE: CPT | Mod: S$GLB,,, | Performed by: INTERNAL MEDICINE

## 2019-03-22 PROCEDURE — 94729 DIFFUSING CAPACITY: CPT | Mod: S$GLB,,, | Performed by: INTERNAL MEDICINE

## 2019-03-22 RX ORDER — FLUTICASONE FUROATE AND VILANTEROL 200; 25 UG/1; UG/1
1 POWDER RESPIRATORY (INHALATION) DAILY
Qty: 60 EACH | Refills: 5 | Status: SHIPPED | OUTPATIENT
Start: 2019-03-22 | End: 2020-03-24

## 2019-03-22 NOTE — PROGRESS NOTES
Pt presented for an echocardiogram with bubble study per Dr. Cordero.  Procedure was explained to the patient, she verbalized understanding.  IV, 24ga x 1 attempt, was started in the Left Upper arm using aseptic technique.  Patient tolerated the procedure well.  IV discontinued, pressure dressing applied.

## 2019-03-22 NOTE — PROGRESS NOTES
Subjective:      Patient ID: Luisa Colon is a 68 y.o. female.  Patient Active Problem List   Diagnosis    Morbid obesity with BMI of 45.0-49.9, adult    Gout of big toe    Aortic stenosis, mild    Essential hypertension    Asthma with COPD    TREVOR (obstructive sleep apnea)    Elevated brain natriuretic peptide (BNP) level    Dyspnea and respiratory abnormalities    Chronic respiratory failure with hypoxia    Pulmonary hypertension     Problem list has been reviewed.    Chief Complaint: Asthma and Sleep Apnea       FEV1: 1.44  (63.5 % predicted)      HPI      ECHO, CXR, 6MWT, ABG and PFT reviewed with patient who expressed and verbalized understanding.     Patients reports NYHA III dyspnea    The patient does not have currently have symptoms / an exacerbation.     Asthma Control Test  In the past 4  weeks, how much of the time did your asthma keep you from getting as much done at work, school or at home?: None of the time  During the past 4 weeks, how often have you had shortness of breath?: Not at all  During the past 4 weeks, how often did your asthma symptoms (wheezing, couging, shortness of breath, chest tightness or pain) wake you up at night or earlier than usual in the morning?: Once or twice  During the past 4 weeks, how often have you used your rescue inhaler or nebulizer medication (such as albuterol)?: 1 or 2 times per day  How would you rate your asthma control during the past 4 weeks?: Somewhat controlled  If your score is 19 or less, your asthma may not be under control: 19        No recent change in breathing.     Her current respiratory therapy regimen is ADVAIR, ALBUTEROL which provides  relief. She is  adherent with her regimen.      She is on continuous oxygen supplementation at 3 l /min.    She refused in - lab sleep testing.     A full  review of systems, past , family  and social histories was performed except as mentioned in the note above, these are non contributory to the main  issues discussed today.        Previous Report Reviewed: lab reports, office notes, operative reports and radiology reports     The following portions of the patient's history were reviewed and updated as appropriate: She  has a past medical history of Arthritis, Asthma with acute exacerbation (1/28/2019), Hypertension, and Pneumonia.  She  has no past surgical history on file.  Her family history is not on file.  She  reports that she quit smoking about 14 years ago. Her smoking use included cigarettes. She started smoking about 55 years ago. she has never used smokeless tobacco. She reports that she does not drink alcohol or use drugs.  She has a current medication list which includes the following prescription(s): ascorbic acid (vitamin c), atenolol-chlorthalidone, cyanocobalamin, fexofenadine, ginkgo biloba, multivitamin, vit a/c/e ac/znox/cupric oxide, vitamin e, fluticasone-vilanterol, and umeclidinium.  She has No Known Allergies..    Review of Systems   Constitutional: Negative for chills, fatigue and night sweats.   HENT: Positive for postnasal drip, rhinorrhea, sinus pressure and congestion. Negative for nosebleeds, trouble swallowing and ear pain.    Respiratory: Positive for cough, sputum production, wheezing and dyspnea on extertion. Negative for hemoptysis.    Cardiovascular: Negative for chest pain, palpitations and leg swelling.   Genitourinary: Negative for difficulty urinating and hematuria.   Endocrine: Negative for cold intolerance and heat intolerance.    Musculoskeletal: Positive for back pain.   Skin: Negative for rash.   Gastrointestinal: Negative for nausea, vomiting, abdominal pain, abdominal distention and acid reflux.   Neurological: Negative for syncope, light-headedness and headaches.   Hematological: No excessive bruising.   Psychiatric/Behavioral: The patient is not nervous/anxious.    All other systems reviewed and are negative.       Objective:   BP (!) 174/73   Pulse 74   Resp 20  "  Ht 5' 4" (1.626 m)   Wt 119.3 kg (263 lb)   SpO2 (!) 94% Comment: 3 liters  BMI 45.14 kg/m²   Body mass index is 45.14 kg/m².    Physical Exam   Constitutional: She is oriented to person, place, and time. She appears well-developed and well-nourished.   HENT:   Head: Normocephalic and atraumatic.   Mallampati 4   Eyes: Pupils are equal, round, and reactive to light.   Neck: Normal range of motion. Neck supple.   39 cm   Cardiovascular: Normal rate and regular rhythm.   Pulmonary/Chest: Effort normal and breath sounds normal.   Abdominal: Soft. Bowel sounds are normal.   Musculoskeletal: Normal range of motion.   Neurological: She is alert and oriented to person, place, and time.   Skin: Skin is warm and dry. Capillary refill takes less than 2 seconds.   Psychiatric: She has a normal mood and affect.   Nursing note and vitals reviewed.      Personal Diagnostic Review  Chest x-ray: 19: Findings: Comparison 2016.  Stable mild cardiomegaly, prominent interstitial markings without focal alveolar consolidation or pleural effusion.  Degenerative change in the thoracic spine.  Some indistinctness of the central pulmonary vascularity is again noted without significant change.  2 views.      CT of chest performed on 19 PE protocol:  Angiogram: Good opacification of the pulmonary arterial system.  No central pulmonary emboli.  No peripheral pulmonary emboli.    Pulmonary: No infiltrates or effusion.  No suspicious pulmonary mass.  Aortic and coronary artery calcifications.  No pericardial fluid.  Scattered mediastinal lymph nodes are present, largest near the AP window region, measuring 2.0 cm.  Small bilateral hilar lymph nodes, largest on the right, measuring 1.5 cm.  No acute upper abdominal findings.  Small retrocrural lymph nodes.    Pulmonary function tests: FEV1: 1.44  (63.5 % predicted), FVC:  2.03 (69.6 % predicted), FEV1/FVC:  71, T.14 (83.4 % predicted), RV/TLVC: 49 (115.5 % predicted), " DLCO: 7.54 (34.8 % predicted) Moderate obstruction. Normal lung volumes, Diffusion capacity is severely reduced    Six minute walk test:Six minute walk distance is 182.88 / 344.54 meters (53.08 % predicted) with light dyspnea. Peak VO2 during walking was 9.47 Ml/min/kg [ 2.71 METS]. During exercise, there was significant desaturation while breathing room air at rest. Oxygen saturation improved to 91% with oxygen supplementation at 5 L /min.  Both blood pressure and heart rate remained stable with walking.  Hypertension was present prior to exercise.  This may represent an abnormal cardiovascular response to exercise.  The patient did not report non-pulmonary symptoms during exercise.  Significant exercise impairment is likely due to desaturation and cardiovascular causes.  The patient did complete the study, walking 254 seconds of the 360 second test.  The patient may benefit from using supplemental oxygen.  No previous study performed.  Based upon age and body mass index, exercise capacity is less than predicted.      2D ECHO WITH COLOR FLOW DOPPLER & BUBBLE CONTRAST    1 - Normal left ventricular systolic function (EF 60-65%).     2 - Normal left ventricular diastolic function.     3 - Normal right ventricular systolic function .     4 - Pulmonary hypertension. The estimated PA systolic pressure is 59 mmHg.     5 - Trivial to mild aortic stenosis, VINEET = 1.17 cm2, AVAi = 0.53 cm2/m2, peak velocity = 2.39 m/s, mean gradient = 11 mmHg.     6 - No evidence of intracardiac shunt.         Cytoplasmic Neutrophilic Ab <1:20 Titer <1:20      CRP 0.0 - 8.2 mg/L 6.6     Sed Rate 0 - 20 mm/Hr 6       AFSHAN Screen Negative <1:160 Positive Abnormal       AFSHAN HEP-2 Titer Positive 1:320 Homogeneous      TSH 0.400 - 4.000 uIU/mL 0.310 Abnormally low      Free T4 0.71 - 1.51 ng/dL 0.93              Assessment / plan        Discussed diagnosis, its evaluation, treatment and usual course. All questions answered.    Problem List Items  Addressed This Visit        Pulmonary    Asthma with COPD    Current Assessment & Plan     Asthma with COPD ROS: taking medications as instructed, no medication side effects noted, no significant ongoing wheezing or shortness of breath, using bronchodilator MDI less than twice a week.   New concerns: None.   Exam: appears well, vitals normal, no respiratory distress, acyanotic, normal RR.   Assessment:  Asthma with COPD well controlled.   Plan: SWITCH TO BREO. START INCRUSE. Contiue  orders as documented in EMR, lab results and schedule of future lab studies reviewed with patient.         Relevant Medications    umeclidinium (INCRUSE ELLIPTA) 62.5 mcg/actuation DsDv    fluticasone-vilanterol (BREO ELLIPTA) 200-25 mcg/dose DsDv diskus inhaler    Chronic respiratory failure with hypoxia - Primary    Current Assessment & Plan     INCREASE SUPPLEMENTAL OXYGEN FROM 3 L /MIN TO 5 L/MIN CONTINUOUS.          Relevant Orders    HME - OTHER    Pulmonary hypertension    Current Assessment & Plan     I have discussed etiology, clinical course and history, diagnostic evaluation, treatment, complications associated with treatment of pulmonary  hypertension in detail with the patient.  The patient expressed and verbalized understanding.  I have also discussed complications of untreated pulmonary hypertension with the patient who expressed and verbalized understanding.  All questions answered.    Subsequent work up to include:   PSG  V/Q Scan  Room air ABG at rest.    Continue all current ongoing medical management. Return in 1 months or sooner if needed.         Relevant Orders    PULM - Arterial Blood Gases--in addition to PFT only    NM Lung Ventilation Perfusion Imaging       Cardiac/Vascular    Aortic stenosis, mild (Chronic)    Overview     ECHO: 03/22/19:  VINEET = 1.17 cm2, AVAi = 0.53 cm2/m2, peak velocity = 2.39 m/s, mean gradient = 11 mmHg         Current Assessment & Plan     Per cardiology.            Endocrine    Morbid  obesity with BMI of 45.0-49.9, adult    Current Assessment & Plan     General weight loss/lifestyle modification strategies discussed (elicit support from others; identify saboteurs; non-food rewards, etc).  Diet interventions: low calorie (1000 kCal/d) deficit diet.  Informal exercise measures discussed, e.g. taking stairs instead of elevator.  Regular aerobic exercise program discussed.            Other    TREVOR (obstructive sleep apnea)    Current Assessment & Plan     Complications of untreated sleep apnea discussed with pateint in detail including but not limited to  high blood pressure, heart attack, stroke, obesity,  diabetes and worsening heart failure.     Patient voiced understanding.    Sleep study re ordered.          Relevant Orders    Polysomnogram (CPAP will be added if patient meets diagnostic criteria.)        TIME SPENT WITH PATIENT: Time spent: 55 minutes in face to face  discussion concerning diagnosis, prognosis, review of lab and test results, benefits of treatment as well as management of disease, counseling of patient and coordination of care between various health  care providers . Greater than half the time spent was used for coordination of care and counseling of patient.     Follow-up in about 1 month (around 4/22/2019) for Asthma with COPD, Morbid Obesity, Chronic Respiratory Failure, TREVOR.

## 2019-03-22 NOTE — PROCEDURES
"[facility]  Six Minute Walk     SUMMARY     Ordering Provider: Dr Cordero      Performing nurse/tech/RT: kurtis  Diagnosis: Shortness of Breath  Height: 5' 4" (162.6 cm)  Weight: 119 kg (262 lb 5.6 oz)  BMI (Calculated): 45.1   Patient Race:             Phase Oxygen Assessment Supplemental O2 Heart   Rate Blood Pressure Anette Dyspnea Scale Rating   Resting 87 % Room Air 65 bpm 174/73 0   Exercise        Minute        1 84 % 2 L/M 72 bpm     2 86 % 3 L/M 79 bpm     3 89 % 4 L/M 79 bpm     4 83 % 4 L/M 79 bpm     5 92 % 5 L/M 73 bpm     6  91 % 5 L/M 76 bpm 178/76 3   Recovery        Minute        1 90 % 5 L/M 67 bpm     2 95 % 5 L/M 64 bpm     3 96 % 3 L/M 65 bpm     4 94 % 3 L/M 64 bpm 168/84 0     Six Minute Walk Summary  6MWT Status: completed with stops        Interpretation:  Did the patient stop or pause?: Yes  How many times did the patient stop or pause?: 3  Stop Time 1: 42  Restart Time 1: 80  Pause Time 1: 38 seconds  Stop Time 2: 120  Restart Time 2: 163  Pause Time 2: 43 seconds  Stop Time 3: 241 seconds  Restart Time 3: 266 seconds  Pause Time 3: 25 seconds           Total Time Walked (Calculated): 254 seconds  Final Partial Lap Distance (feet): 0 feet  Total Distance Meters (Calculated): 182.88 meters  Predicted Distance Meters (Calculated): 344.54 meters  Percentage of Predicted (Calculated): 53.08  Peak VO2 (Calculated): 9.47  Mets: 2.71  Has The Patient Had a Previous Six Minute Walk Test?: No       Previous 6MWT Results  Has The Patient Had a Previous Six Minute Walk Test?: No          PHYSICIAN INTERPRETATION:      Six minute walk distance is 182.88 / 344.54 meters (53.08 % predicted) with light dyspnea. Peak VO2 during walking was 9.47 Ml/min/kg [ 2.71 METS]. During exercise, there was significant desaturation while breathing room air at rest. Oxygen saturation improved to 91% with oxygen supplementation at 5 L /min.  Both blood pressure and heart rate remained stable " with walking.  Hypertension was present prior to exercise.  This may represent an abnormal cardiovascular response to exercise.  The patient did not report non-pulmonary symptoms during exercise.  Significant exercise impairment is likely due to desaturation and cardiovascular causes.  The patient did complete the study, walking 254 seconds of the 360 second test.  The patient may benefit from using supplemental oxygen.  No previous study performed.  Based upon age and body mass index, exercise capacity is less than predicted.

## 2019-03-22 NOTE — ASSESSMENT & PLAN NOTE
Complications of untreated sleep apnea discussed with pateint in detail including but not limited to  high blood pressure, heart attack, stroke, obesity,  diabetes and worsening heart failure.     Patient voiced understanding.    Sleep study re ordered.

## 2019-03-22 NOTE — PATIENT INSTRUCTIONS
Chronic Lung Disease: If Oxygen Is Prescribed   Supplemental oxygen is prescribed if tests show that the level of oxygen in your blood is too low. If the level stays too low for too long, serious problems can develop in many parts of the body. Supplemental oxygen helps to relieve your symptoms and prevent future problems by getting more oxygen to the blood. Depending on your test results, you may need oxygen all the time. Or it may only be needed during certain activities, such as exercise or sleep. When oxygen is prescribed, youll be referred to a medical equipment company. They will set up the oxygen unit and teach you how to use it.  Nasal cannula     A nasal cannula should be placed in the nose with the prongs arching toward you.     Oxygen is most often inhaled through a nasal cannula (lightweight tube with two hollow prongs that fit just inside the nose).  Types of supplemental oxygen    Compressed oxygen   Oxygen concentrator   Liquid oxygen   Prescribed oxygen comes in several forms. You may use more than one type, depending on when you need oxygen:  · Compressed oxygen is oxygen gas stored in a tank. Because the oxygen is stored under pressure, these tanks must be handled carefully. Gauges on the tank can be used to adjust the oxygen flow rate. Your healthcare provider will determine what this should be. Small tanks can be carried. Larger tanks are on wheels and can be pulled around the house.  · An oxygen concentrator is a machine about the size of a large suitcase. It plugs into an electrical outlet. (A back-up oxygen supply is recommended in case of a power outage.) The machine takes oxygen from the air and concentrates it. Its then delivered to you through plastic tubing. The tubing is long enough so that you can move around the house. When youre using the concentrator, it must be kept somewhere that has a good supply of fresh air. (Dont keep it in a confined space, like a closet.) You may be set  up on a concentrator if you need oxygen all the time or while youre sleeping.  · Liquid oxygen results when oxygen gas is cooled to a very low temperature. Its kept in special containers that maintain this low temperature. When you use liquid oxygen, its warmed and becomes gas before reaching the cannula. Most tanks come with a portable unit that you can carry or pull on a cart. Some of these weigh only a few pounds. Liquid oxygen units are easy to carry around. If you need oxygen all the time or during activity, this kind of unit can help you stay active.  Oxygen is prescribed just for you  Your healthcare provider will prescribe oxygen based on your needs. Here are a few things you should know:  · A therapist from the medical equipment company will explain when to use oxygen and what type to use. Youll be taught how to use and maintain your oxygen equipment.  · You must use the exact rate of oxygen prescribed for each activity. Dont increase or decrease the amount without asking your healthcare provider first.  · Supplemental oxygen is a medicine. Its not addictive and causes no side effects when used as directed.   Date Last Reviewed: 5/1/2016  © 9346-1222 [employer]. [address] [place] [address], [place] [address]. All rights reserved. This information is not intended as a substitute for professional medical care. Always follow your healthcare professional's instructions.

## 2019-03-22 NOTE — ASSESSMENT & PLAN NOTE
Asthma with COPD ROS: taking medications as instructed, no medication side effects noted, no significant ongoing wheezing or shortness of breath, using bronchodilator MDI less than twice a week.   New concerns: None.   Exam: appears well, vitals normal, no respiratory distress, acyanotic, normal RR.   Assessment:  Asthma with COPD well controlled.   Plan: SWITCH TO BREO. START INCRUSE. Contiue  orders as documented in EMR, lab results and schedule of future lab studies reviewed with patient.

## 2019-03-22 NOTE — ASSESSMENT & PLAN NOTE
I have discussed etiology, clinical course and history, diagnostic evaluation, treatment, complications associated with treatment of pulmonary  hypertension in detail with the patient.  The patient expressed and verbalized understanding.  I have also discussed complications of untreated pulmonary hypertension with the patient who expressed and verbalized understanding.  All questions answered.    Subsequent work up to include:   PSG  V/Q Scan  Room air ABG at rest.    Continue all current ongoing medical management. Return in 1 months or sooner if needed.

## 2019-03-27 ENCOUNTER — PROCEDURE VISIT (OUTPATIENT)
Dept: SLEEP MEDICINE | Facility: CLINIC | Age: 69
End: 2019-03-27
Payer: MEDICARE

## 2019-03-27 DIAGNOSIS — G47.33 OSA (OBSTRUCTIVE SLEEP APNEA): Primary | ICD-10-CM

## 2019-03-27 DIAGNOSIS — G47.33 OSA (OBSTRUCTIVE SLEEP APNEA): Chronic | ICD-10-CM

## 2019-03-27 PROCEDURE — 95806 PR SLEEP STUDY, UNATTENDED, SIMUL RECORD HR/O2 SAT/RESP FLOW/RESP EFFT: ICD-10-PCS | Mod: S$GLB,,, | Performed by: INTERNAL MEDICINE

## 2019-03-27 PROCEDURE — 99499 UNLISTED E&M SERVICE: CPT | Mod: S$GLB,,, | Performed by: INTERNAL MEDICINE

## 2019-03-27 PROCEDURE — 99499 NO LOS: ICD-10-PCS | Mod: S$GLB,,, | Performed by: INTERNAL MEDICINE

## 2019-03-27 PROCEDURE — 95806 SLEEP STUDY UNATT&RESP EFFT: CPT | Mod: S$GLB,,, | Performed by: INTERNAL MEDICINE

## 2019-03-27 NOTE — PROCEDURES
Polysomnogram (CPAP will be added if patient meets diagnostic criteria.)  Date/Time: 3/27/2019 2:24 PM  Performed by: Favian Moise MD  Authorized by: Garret Cordero MD       Assessment and Recommendations  Adequate study. Duration 7 hr 44 min. Lowest oxygen saturation was less than 70%. Oxygen saturation was  between 70% to 79% for 31 min. Very significant heart rate variability observed. Average heart rate of 61 beats  per minute with a maximal heart rate of 195 beats per minute. Snoring recorded above 50 decibels 98% of the  time.  Apnea-hypopnea index: AHI: 35.2 events per hour. Total events 272.  Severe obstructive sleep apnea-hypopnea syndrome with severe hypoxemia  Treatment recommendations:  Treatment with CPAP/BiPAP: In-lab study preferred.  Weight loss recommended  Close follow-up with ordering clinician.  
Procedures     Assessment and Recommendations  Adequate study. Duration 7 hr 44 min. Lowest oxygen saturation was less than 70%. Oxygen saturation was  between 70% to 79% for 31 min. Very significant heart rate variability observed. Average heart rate of 61 beats  per minute with a maximal heart rate of 195 beats per minute. Snoring recorded above 50 decibels 98% of the  time.  Apnea-hypopnea index: AHI: 35.2 events per hour. Total events 272.  Severe obstructive sleep apnea-hypopnea syndrome with severe hypoxemia  Treatment recommendations:  Treatment with CPAP/BiPAP: In-lab study preferred.  Weight loss recommended  Close follow-up with ordering clinician.  
Wine

## 2019-03-27 NOTE — PROGRESS NOTES
Assessment and Recommendations  Adequate study. Duration 7 hr 44 min. Lowest oxygen saturation was less than 70%. Oxygen saturation was  between 70% to 79% for 31 min. Very significant heart rate variability observed. Average heart rate of 61 beats  per minute with a maximal heart rate of 195 beats per minute. Snoring recorded above 50 decibels 98% of the  time.  Apnea-hypopnea index: AHI: 35.2 events per hour. Total events 272.  Severe obstructive sleep apnea-hypopnea syndrome with severe hypoxemia  Treatment recommendations:  Treatment with CPAP/BiPAP: In-lab study preferred.  Weight loss recommended  Close follow-up with ordering clinician.

## 2019-03-29 ENCOUNTER — TELEPHONE (OUTPATIENT)
Dept: PULMONOLOGY | Facility: CLINIC | Age: 69
End: 2019-03-29

## 2019-03-29 DIAGNOSIS — G47.33 OSA (OBSTRUCTIVE SLEEP APNEA): Primary | ICD-10-CM

## 2019-03-29 NOTE — TELEPHONE ENCOUNTER
----- Message from Garret Cordero MD sent at 3/27/2019  2:27 PM CDT -----  Regarding: HST results  Home sleep study Results reviewed:     Adequate study. Duration 7 hr 44 min. Lowest oxygen saturation was less than 70%. Oxygen saturation was  between 70% to 79% for 31 min. Very significant heart rate variability observed. Average heart rate of 61 beats  per minute with a maximal heart rate of 195 beats per minute. Snoring recorded above 50 decibels 98% of the  time.      Assessment:   Apnea-hypopnea index: AHI: 35.2 events per hour. Total events 272.  Severe obstructive sleep apnea-hypopnea syndrome with severe hypoxemia      Plan:   IN LAB CPAP titration PSG recommended.       Please inform pateint

## 2019-04-03 ENCOUNTER — TELEPHONE (OUTPATIENT)
Dept: PULMONOLOGY | Facility: CLINIC | Age: 69
End: 2019-04-03

## 2019-04-03 NOTE — TELEPHONE ENCOUNTER
Patient request to have oxygen discontinued, she states that she does not use. Explained to patient the last six minute walk on 3/22/19 shows that she need 5 L of oxygen with exertion.

## 2019-04-03 NOTE — TELEPHONE ENCOUNTER
----- Message from Andreas Osborne sent at 4/3/2019  2:08 PM CDT -----  Contact: Pt  Please give pt a call at ..[phone] (home) she is requesting a letter to be sent to the medical equipment place so that cn come  the equipment because she doesn't use it anymore

## 2019-07-02 ENCOUNTER — TELEPHONE (OUTPATIENT)
Dept: PULMONOLOGY | Facility: CLINIC | Age: 69
End: 2019-07-02

## 2020-03-24 DIAGNOSIS — J44.89 ASTHMA WITH COPD: ICD-10-CM

## 2020-03-24 RX ORDER — UMECLIDINIUM 62.5 UG/1
AEROSOL, POWDER ORAL
Qty: 60 EACH | Refills: 11 | Status: SHIPPED | OUTPATIENT
Start: 2020-03-24

## 2020-03-24 RX ORDER — FLUTICASONE FUROATE AND VILANTEROL TRIFENATATE 200; 25 UG/1; UG/1
POWDER RESPIRATORY (INHALATION)
Qty: 60 EACH | Refills: 11 | Status: SHIPPED | OUTPATIENT
Start: 2020-03-24

## 2021-04-29 ENCOUNTER — PATIENT MESSAGE (OUTPATIENT)
Dept: RESEARCH | Facility: HOSPITAL | Age: 71
End: 2021-04-29

## 2023-05-01 ENCOUNTER — HOSPITAL ENCOUNTER (OUTPATIENT)
Dept: RADIOLOGY | Facility: HOSPITAL | Age: 73
Discharge: HOME | End: 2023-05-01
Attending: NURSE PRACTITIONER
Payer: MEDICARE

## 2023-05-01 DIAGNOSIS — J20.9 ACUTE BRONCHITIS: ICD-10-CM

## 2023-05-01 DIAGNOSIS — J20.9 ACUTE BRONCHITIS: Primary | ICD-10-CM

## 2023-05-01 PROCEDURE — 71046 X-RAY EXAM CHEST 2 VIEWS: CPT | Mod: TC,PO

## 2023-05-01 PROCEDURE — 71046 X-RAY EXAM CHEST 2 VIEWS: CPT | Mod: 26,,, | Performed by: RADIOLOGY

## 2023-05-01 PROCEDURE — 71046 XR CHEST PA AND LATERAL: ICD-10-PCS | Mod: 26,,, | Performed by: RADIOLOGY

## 2023-05-22 ENCOUNTER — PATIENT MESSAGE (OUTPATIENT)
Dept: RESEARCH | Facility: HOSPITAL | Age: 73
End: 2023-05-22
Payer: MEDICARE

## 2024-04-12 NOTE — SUBJECTIVE & OBJECTIVE
Past Medical History:   Diagnosis Date    Arthritis     Asthma with acute exacerbation 1/28/2019    Hypertension     Pneumonia        History reviewed. No pertinent surgical history.    Review of patient's allergies indicates:  No Known Allergies    Family History     None        Tobacco Use    Smoking status: Former Smoker    Smokeless tobacco: Never Used   Substance and Sexual Activity    Alcohol use: No     Frequency: Never    Drug use: No    Sexual activity: Not on file         Review of Systems   Constitutional: Positive for fatigue.   HENT: Negative.    Respiratory: Positive for apnea and shortness of breath.    Cardiovascular: Negative.    Gastrointestinal: Negative.    Endocrine: Negative.    Genitourinary: Negative.    Musculoskeletal: Negative.    Allergic/Immunologic: Negative.    Hematological: Negative.    Psychiatric/Behavioral: Negative.      Objective:     Vital Signs (Most Recent):  Temp: 98.4 °F (36.9 °C) (01/29/19 1534)  Pulse: 67 (01/29/19 1534)  Resp: 18 (01/29/19 1534)  BP: (!) 143/72 (01/29/19 1534)  SpO2: (!) 91 % (01/29/19 1534) Vital Signs (24h Range):  Temp:  [96.1 °F (35.6 °C)-98.4 °F (36.9 °C)] 98.4 °F (36.9 °C)  Pulse:  [65-82] 67  Resp:  [17-20] 18  SpO2:  [85 %-97 %] 91 %  BP: (129-169)/(62-79) 143/72     Weight: 120 kg (264 lb 8.8 oz)  Body mass index is 45.41 kg/m².      Intake/Output Summary (Last 24 hours) at 1/29/2019 1542  Last data filed at 1/29/2019 1300  Gross per 24 hour   Intake 600 ml   Output --   Net 600 ml       Physical Exam   Constitutional: She is oriented to person, place, and time. She appears well-developed and well-nourished.   HENT:   Head: Normocephalic and atraumatic.   Nose: Nose normal.   Mouth/Throat: Oropharynx is clear and moist. No oropharyngeal exudate.   Eyes: Conjunctivae and EOM are normal. Pupils are equal, round, and reactive to light. Left eye exhibits no discharge. No scleral icterus.   Neck: Normal range of motion. Neck supple. No  Stable, continue present management    tracheal deviation present. No thyromegaly present.   Cardiovascular: Normal rate, regular rhythm and normal heart sounds.   No murmur heard.  Pulmonary/Chest: Effort normal. She has decreased breath sounds. She has no wheezes. She has no rhonchi. She has no rales.   Abdominal: Soft. Bowel sounds are normal.   Musculoskeletal: Normal range of motion. She exhibits no edema.   Neurological: She is alert and oriented to person, place, and time. No cranial nerve deficit.   Skin: Skin is warm and dry. Capillary refill takes 2 to 3 seconds.   Psychiatric: She has a normal mood and affect.   Nursing note and vitals reviewed.      Vents:  Oxygen Concentration (%): 40 (01/28/19 2103)    Lines/Drains/Airways          None          Significant Labs:    CBC/Anemia Profile:  Recent Labs   Lab 01/29/19  0922   WBC 14.93*   HGB 16.7*   HCT 50.0*      *   RDW 13.3        Chemistries:  Recent Labs   Lab 01/29/19  0922      K 3.9   CL 96   CO2 28   BUN 51*   CREATININE 1.0   CALCIUM 9.7       ABGs:   Recent Labs   Lab 01/28/19  0903   PH 7.328*   PCO2 60.3*   HCO3 31.7*   POCSATURATED 88*   BE 6     Cardiac Markers: No results for input(s): CKMB, TROPONINT, MYOGLOBIN in the last 48 hours.  Respiratory Culture: No results for input(s): GSRESP, RESPIRATORYC in the last 48 hours.  All pertinent labs within the past 24 hours have been reviewed.    Significant Imaging:   I have reviewed and interpreted all pertinent imaging results/findings within the past 24 hours.     Chest CT  Angiogram: Good opacification of the pulmonary arterial system.  No central pulmonary emboli.  No peripheral pulmonary emboli.    Pulmonary: No infiltrates or effusion.  No suspicious pulmonary mass.  Aortic and coronary artery calcifications.  No pericardial fluid.  Scattered mediastinal lymph nodes are present, largest near the AP window region, measuring 2.0 cm.  Small bilateral hilar lymph nodes, largest on the right, measuring 1.5 cm.  No  acute upper abdominal findings.  Small retrocrural lymph nodes.      Impression       No pulmonary emboli are seen.  No acute pulmonary parenchymal findings.  Scattered small lymph nodes, as above.

## 2025-01-14 ENCOUNTER — HOSPITAL ENCOUNTER (OUTPATIENT)
Dept: RADIOLOGY | Facility: HOSPITAL | Age: 75
Discharge: HOME | End: 2025-01-14
Attending: FAMILY MEDICINE
Payer: MEDICARE

## 2025-01-14 DIAGNOSIS — S69.91XA INJURY OF RIGHT HAND: Primary | ICD-10-CM

## 2025-01-14 DIAGNOSIS — S69.91XA INJURY OF RIGHT HAND: ICD-10-CM

## 2025-01-14 PROCEDURE — 73130 X-RAY EXAM OF HAND: CPT | Mod: 26,RT,, | Performed by: RADIOLOGY

## 2025-01-14 PROCEDURE — 73130 X-RAY EXAM OF HAND: CPT | Mod: TC,PO,RT
